# Patient Record
Sex: MALE | Race: WHITE | NOT HISPANIC OR LATINO
[De-identification: names, ages, dates, MRNs, and addresses within clinical notes are randomized per-mention and may not be internally consistent; named-entity substitution may affect disease eponyms.]

---

## 2021-05-17 PROBLEM — Z00.00 ENCOUNTER FOR PREVENTIVE HEALTH EXAMINATION: Status: ACTIVE | Noted: 2021-05-17

## 2021-05-18 ENCOUNTER — NON-APPOINTMENT (OUTPATIENT)
Age: 71
End: 2021-05-18

## 2021-05-25 ENCOUNTER — APPOINTMENT (OUTPATIENT)
Dept: OTOLARYNGOLOGY | Facility: CLINIC | Age: 71
End: 2021-05-25
Payer: MEDICARE

## 2021-05-25 ENCOUNTER — NON-APPOINTMENT (OUTPATIENT)
Age: 71
End: 2021-05-25

## 2021-05-25 VITALS — BODY MASS INDEX: 29.03 KG/M2 | HEIGHT: 69 IN | WEIGHT: 196 LBS

## 2021-05-25 DIAGNOSIS — Z82.49 FAMILY HISTORY OF ISCHEMIC HEART DISEASE AND OTHER DISEASES OF THE CIRCULATORY SYSTEM: ICD-10-CM

## 2021-05-25 DIAGNOSIS — F32.9 MAJOR DEPRESSIVE DISORDER, SINGLE EPISODE, UNSPECIFIED: ICD-10-CM

## 2021-05-25 DIAGNOSIS — Z72.89 OTHER PROBLEMS RELATED TO LIFESTYLE: ICD-10-CM

## 2021-05-25 DIAGNOSIS — Z78.9 OTHER SPECIFIED HEALTH STATUS: ICD-10-CM

## 2021-05-25 DIAGNOSIS — C61 MALIGNANT NEOPLASM OF PROSTATE: ICD-10-CM

## 2021-05-25 DIAGNOSIS — I10 ESSENTIAL (PRIMARY) HYPERTENSION: ICD-10-CM

## 2021-05-25 DIAGNOSIS — E78.5 HYPERLIPIDEMIA, UNSPECIFIED: ICD-10-CM

## 2021-05-25 PROCEDURE — 99205 OFFICE O/P NEW HI 60 MIN: CPT | Mod: 25

## 2021-05-25 RX ORDER — AMLODIPINE BESYLATE 5 MG/1
TABLET ORAL
Refills: 0 | Status: ACTIVE | COMMUNITY

## 2021-05-25 RX ORDER — OMEPRAZOLE 20 MG/1
20 CAPSULE, DELAYED RELEASE ORAL
Refills: 0 | Status: ACTIVE | COMMUNITY

## 2021-05-25 RX ORDER — ATORVASTATIN CALCIUM 80 MG/1
TABLET, FILM COATED ORAL
Refills: 0 | Status: ACTIVE | COMMUNITY

## 2021-05-25 RX ORDER — FLUTICASONE PROPIONATE 50 UG/1
50 SPRAY, METERED NASAL
Refills: 0 | Status: ACTIVE | COMMUNITY

## 2021-05-25 RX ORDER — TAMSULOSIN HYDROCHLORIDE 0.4 MG/1
CAPSULE ORAL
Refills: 0 | Status: ACTIVE | COMMUNITY

## 2021-05-25 RX ORDER — CARVEDILOL 12.5 MG/1
12.5 TABLET, FILM COATED ORAL
Refills: 0 | Status: ACTIVE | COMMUNITY

## 2021-05-25 RX ORDER — SERTRALINE HYDROCHLORIDE 25 MG/1
TABLET, FILM COATED ORAL
Refills: 0 | Status: ACTIVE | COMMUNITY

## 2021-05-25 NOTE — CONSULT LETTER
[Dear  ___] : Dear ~TEE, [Consult Letter:] : I had the pleasure of evaluating your patient, [unfilled]. [Please see my note below.] : Please see my note below. [Consult Closing:] : Thank you very much for allowing me to participate in the care of this patient.  If you have any questions, please do not hesitate to contact me. [Sincerely,] : Sincerely, [FreeTextEntry2] : Martin Schmid MD DDS [FreeTextEntry3] : Lauren Dao MD\par Otolaryngology - Head & Neck Surgery\par

## 2021-05-25 NOTE — HISTORY OF PRESENT ILLNESS
[de-identified] : Patient seen today in consultation at the kind request of Dr Martin Schmid for evaluation for reconstruction following right maxillectomy for ameloblastoma . Mr Brittany noted changes to his right upper gums in Jan 2021, thought he had a dental infection. Seen by a dentist, he had a scan which showed right maxillary mass. He then saw an ENT, Dr Damon Johnson, who performed biopsy, showed ameloblastoma. He was evaluated at [facility] and [facility] however they could not offer dental implants at the time of surgery, he then sought out Dr Schmid. Patient underwent MRI as well as CT LE last week in [place], [place]. He lives in [place].

## 2021-05-25 NOTE — ASSESSMENT
[FreeTextEntry1] : - discussed reconstructive options with patient following right maxillectomy for ameloblastoma. Options include osteocutaneous free flap reconstruction vs soft tissue vs obturator. Patient is interested in dental restoration, therefore soft tissue is the best option for him. He is not interested in an obturator as this requires daily removal and cleaning. Discussed osteocutaneous fibula free flap, possible bone only free flap depending on soft tissue defect. Discussed extensively the details of free flap with microvascular reconstruction. Discussed extensively the risks, benefits, and alternatives to treatment. Risks include but not limited to free flap loss, poor wound healing, scarring, need for wound care, possible return to OR, possible non-healing wounds, donor site morbidity including scarring and infection. Patient voices understanding and questions were answered to the patient's apparent satisfaction\par - he is requesting that the anesthesiologist be covered by his insurance, will notify OR booking\par - will f/u recent CT LE

## 2021-05-25 NOTE — PHYSICAL EXAM
[Nasal Endoscopy Performed] : nasal endoscopy was performed, see procedure section for findings [Midline] : trachea located in midline position [Normal] : no rashes [de-identified] : bilateral dorsalis pedis pulses

## 2021-05-25 NOTE — DATA REVIEWED
[de-identified] : relevant images and reports personally reviewed by me:\par 5/12/21\par CT sinus:\par 1. Status post interval sinonasal surgery.\par 2. Almost complete opacification of the right maxillary sinus with lytic expansion of the inferior maxilla. Differential consideration may include a polyp but the possibility of an inverted papilloma cannot be entirely excluded . If additional imaging is indicated clinically, a follow-up MRI may be performed for further evaluation.\par 3. Mild mucosal thickening in the bilateral frontal, left anterior ethmoid and the left maxillary sinuses.

## 2021-06-17 ENCOUNTER — APPOINTMENT (OUTPATIENT)
Dept: OTOLARYNGOLOGY | Facility: CLINIC | Age: 71
End: 2021-06-17

## 2021-07-13 ENCOUNTER — TRANSCRIPTION ENCOUNTER (OUTPATIENT)
Age: 71
End: 2021-07-13

## 2021-07-13 NOTE — PATIENT PROFILE ADULT - NSASFALLATTEMPTOOB_GEN_A_NUR
Worsenign of GFR noted  meds adjusted  Labs in 2-3 weeks prior next visit  Nephrology evaluation is advised   no

## 2021-07-14 ENCOUNTER — RESULT REVIEW (OUTPATIENT)
Age: 71
End: 2021-07-14

## 2021-07-14 ENCOUNTER — INPATIENT (INPATIENT)
Facility: HOSPITAL | Age: 71
LOS: 11 days | Discharge: NURSING, REHAB OR LONG-TERM CARE | DRG: 465 | End: 2021-07-26
Attending: DENTIST | Admitting: DENTIST
Payer: MEDICARE

## 2021-07-14 ENCOUNTER — APPOINTMENT (OUTPATIENT)
Dept: OTOLARYNGOLOGY | Facility: HOSPITAL | Age: 71
End: 2021-07-14

## 2021-07-14 VITALS
HEART RATE: 53 BPM | OXYGEN SATURATION: 95 % | WEIGHT: 199.74 LBS | DIASTOLIC BLOOD PRESSURE: 77 MMHG | TEMPERATURE: 98 F | SYSTOLIC BLOOD PRESSURE: 142 MMHG | HEIGHT: 69 IN | RESPIRATION RATE: 16 BRPM

## 2021-07-14 DIAGNOSIS — C41.0 MALIGNANT NEOPLASM OF BONES OF SKULL AND FACE: ICD-10-CM

## 2021-07-14 DIAGNOSIS — Z85.46 PERSONAL HISTORY OF MALIGNANT NEOPLASM OF PROSTATE: ICD-10-CM

## 2021-07-14 DIAGNOSIS — T41.205A ADVERSE EFFECT OF UNSPECIFIED GENERAL ANESTHETICS, INITIAL ENCOUNTER: ICD-10-CM

## 2021-07-14 DIAGNOSIS — R33.9 RETENTION OF URINE, UNSPECIFIED: ICD-10-CM

## 2021-07-14 DIAGNOSIS — I10 ESSENTIAL (PRIMARY) HYPERTENSION: ICD-10-CM

## 2021-07-14 DIAGNOSIS — Z98.890 OTHER SPECIFIED POSTPROCEDURAL STATES: Chronic | ICD-10-CM

## 2021-07-14 DIAGNOSIS — K40.90 UNILATERAL INGUINAL HERNIA, WITHOUT OBSTRUCTION OR GANGRENE, NOT SPECIFIED AS RECURRENT: Chronic | ICD-10-CM

## 2021-07-14 DIAGNOSIS — F43.22 ADJUSTMENT DISORDER WITH ANXIETY: ICD-10-CM

## 2021-07-14 DIAGNOSIS — R41.0 DISORIENTATION, UNSPECIFIED: ICD-10-CM

## 2021-07-14 DIAGNOSIS — E78.5 HYPERLIPIDEMIA, UNSPECIFIED: ICD-10-CM

## 2021-07-14 DIAGNOSIS — T42.4X5A ADVERSE EFFECT OF BENZODIAZEPINES, INITIAL ENCOUNTER: ICD-10-CM

## 2021-07-14 DIAGNOSIS — K21.9 GASTRO-ESOPHAGEAL REFLUX DISEASE WITHOUT ESOPHAGITIS: ICD-10-CM

## 2021-07-14 LAB
ALBUMIN SERPL ELPH-MCNC: 3.3 G/DL — SIGNIFICANT CHANGE UP (ref 3.3–5)
ANION GAP SERPL CALC-SCNC: 10 MMOL/L — SIGNIFICANT CHANGE UP (ref 5–17)
BASE EXCESS BLDA CALC-SCNC: -1 MMOL/L — SIGNIFICANT CHANGE UP (ref -2–3)
BASE EXCESS BLDA CALC-SCNC: -3.2 MMOL/L — LOW (ref -2–3)
BASE EXCESS BLDA CALC-SCNC: 0 MMOL/L — SIGNIFICANT CHANGE UP (ref -2–3)
BASOPHILS # BLD AUTO: 0.03 K/UL — SIGNIFICANT CHANGE UP (ref 0–0.2)
BASOPHILS NFR BLD AUTO: 0.2 % — SIGNIFICANT CHANGE UP (ref 0–2)
BUN SERPL-MCNC: 22 MG/DL — SIGNIFICANT CHANGE UP (ref 7–23)
CA-I BLDA-SCNC: 1.08 MMOL/L — LOW (ref 1.15–1.33)
CA-I BLDA-SCNC: 1.1 MMOL/L — LOW (ref 1.15–1.33)
CA-I BLDA-SCNC: 1.17 MMOL/L — SIGNIFICANT CHANGE UP (ref 1.15–1.33)
CALCIUM SERPL-MCNC: 7.8 MG/DL — LOW (ref 8.4–10.5)
CHLORIDE SERPL-SCNC: 108 MMOL/L — SIGNIFICANT CHANGE UP (ref 96–108)
CO2 BLDA-SCNC: 23 MMOL/L — SIGNIFICANT CHANGE UP (ref 19–24)
CO2 BLDA-SCNC: 26 MMOL/L — HIGH (ref 19–24)
CO2 SERPL-SCNC: 21 MMOL/L — LOW (ref 22–31)
COHGB MFR BLDA: 0.8 % — SIGNIFICANT CHANGE UP
COHGB MFR BLDA: 1 % — SIGNIFICANT CHANGE UP
COHGB MFR BLDA: 1.4 % — SIGNIFICANT CHANGE UP
CREAT SERPL-MCNC: 1.18 MG/DL — SIGNIFICANT CHANGE UP (ref 0.5–1.3)
EOSINOPHIL # BLD AUTO: 0 K/UL — SIGNIFICANT CHANGE UP (ref 0–0.5)
EOSINOPHIL NFR BLD AUTO: 0 % — SIGNIFICANT CHANGE UP (ref 0–6)
GLUCOSE BLDC GLUCOMTR-MCNC: 148 MG/DL — HIGH (ref 70–99)
GLUCOSE BLDC GLUCOMTR-MCNC: 160 MG/DL — HIGH (ref 70–99)
GLUCOSE BLDC GLUCOMTR-MCNC: 177 MG/DL — HIGH (ref 70–99)
GLUCOSE BLDC GLUCOMTR-MCNC: 209 MG/DL — HIGH (ref 70–99)
GLUCOSE SERPL-MCNC: 215 MG/DL — HIGH (ref 70–99)
HCO3 BLDA-SCNC: 22 MMOL/L — SIGNIFICANT CHANGE UP (ref 21–28)
HCO3 BLDA-SCNC: 24 MMOL/L — SIGNIFICANT CHANGE UP (ref 21–28)
HCO3 BLDA-SCNC: 26 MMOL/L — SIGNIFICANT CHANGE UP (ref 21–28)
HCT VFR BLD CALC: 32.3 % — LOW (ref 39–50)
HGB BLD-MCNC: 10.8 G/DL — LOW (ref 13–17)
HGB BLDA-MCNC: 11.7 G/DL — LOW (ref 12.6–17.4)
HGB BLDA-MCNC: 9.4 G/DL — LOW (ref 12.6–17.4)
HGB BLDA-MCNC: 9.6 G/DL — LOW (ref 12.6–17.4)
IMM GRANULOCYTES NFR BLD AUTO: 0.3 % — SIGNIFICANT CHANGE UP (ref 0–1.5)
LACTATE SERPL-SCNC: 1.7 MMOL/L — SIGNIFICANT CHANGE UP (ref 0.5–2)
LYMPHOCYTES # BLD AUTO: 0.62 K/UL — LOW (ref 1–3.3)
LYMPHOCYTES # BLD AUTO: 4.2 % — LOW (ref 13–44)
MAGNESIUM SERPL-MCNC: 1.4 MG/DL — LOW (ref 1.6–2.6)
MCHC RBC-ENTMCNC: 27.6 PG — SIGNIFICANT CHANGE UP (ref 27–34)
MCHC RBC-ENTMCNC: 33.4 GM/DL — SIGNIFICANT CHANGE UP (ref 32–36)
MCV RBC AUTO: 82.6 FL — SIGNIFICANT CHANGE UP (ref 80–100)
METHGB MFR BLDA: 0.8 % — SIGNIFICANT CHANGE UP
METHGB MFR BLDA: 0.8 % — SIGNIFICANT CHANGE UP
METHGB MFR BLDA: 1.1 % — SIGNIFICANT CHANGE UP
MONOCYTES # BLD AUTO: 0.83 K/UL — SIGNIFICANT CHANGE UP (ref 0–0.9)
MONOCYTES NFR BLD AUTO: 5.6 % — SIGNIFICANT CHANGE UP (ref 2–14)
NEUTROPHILS # BLD AUTO: 13.38 K/UL — HIGH (ref 1.8–7.4)
NEUTROPHILS NFR BLD AUTO: 89.7 % — HIGH (ref 43–77)
NRBC # BLD: 0 /100 WBCS — SIGNIFICANT CHANGE UP (ref 0–0)
OXYHGB MFR BLDA: 97.9 % — HIGH (ref 90–95)
OXYHGB MFR BLDA: 98 % — HIGH (ref 90–95)
OXYHGB MFR BLDA: 98.1 % — HIGH (ref 90–95)
PCO2 BLDA: 39 MMHG — SIGNIFICANT CHANGE UP (ref 35–48)
PCO2 BLDA: 42 MMHG — SIGNIFICANT CHANGE UP (ref 35–48)
PCO2 BLDA: 47 MMHG — SIGNIFICANT CHANGE UP (ref 35–48)
PH BLDA: 7.35 — SIGNIFICANT CHANGE UP (ref 7.35–7.45)
PH BLDA: 7.36 — SIGNIFICANT CHANGE UP (ref 7.35–7.45)
PH BLDA: 7.37 — SIGNIFICANT CHANGE UP (ref 7.35–7.45)
PHOSPHATE SERPL-MCNC: 5 MG/DL — HIGH (ref 2.5–4.5)
PLATELET # BLD AUTO: 158 K/UL — SIGNIFICANT CHANGE UP (ref 150–400)
PO2 BLDA: 180 MMHG — HIGH (ref 83–108)
PO2 BLDA: 254 MMHG — HIGH (ref 83–108)
PO2 BLDA: 281 MMHG — HIGH (ref 83–108)
POTASSIUM BLDA-SCNC: 3.9 MMOL/L — SIGNIFICANT CHANGE UP (ref 3.5–5.1)
POTASSIUM BLDA-SCNC: 3.9 MMOL/L — SIGNIFICANT CHANGE UP (ref 3.5–5.1)
POTASSIUM BLDA-SCNC: 4.1 MMOL/L — SIGNIFICANT CHANGE UP (ref 3.5–5.1)
POTASSIUM SERPL-MCNC: 4.5 MMOL/L — SIGNIFICANT CHANGE UP (ref 3.5–5.3)
POTASSIUM SERPL-SCNC: 4.5 MMOL/L — SIGNIFICANT CHANGE UP (ref 3.5–5.3)
RBC # BLD: 3.91 M/UL — LOW (ref 4.2–5.8)
RBC # FLD: 13.2 % — SIGNIFICANT CHANGE UP (ref 10.3–14.5)
SAO2 % BLDA: 100 % — HIGH (ref 94–98)
SAO2 % BLDA: 100 % — HIGH (ref 94–98)
SAO2 % BLDA: 99.8 % — HIGH (ref 94–98)
SODIUM BLDA-SCNC: 137 MMOL/L — SIGNIFICANT CHANGE UP (ref 136–145)
SODIUM BLDA-SCNC: 138 MMOL/L — SIGNIFICANT CHANGE UP (ref 136–145)
SODIUM BLDA-SCNC: 139 MMOL/L — SIGNIFICANT CHANGE UP (ref 136–145)
SODIUM SERPL-SCNC: 139 MMOL/L — SIGNIFICANT CHANGE UP (ref 135–145)
WBC # BLD: 14.91 K/UL — HIGH (ref 3.8–10.5)
WBC # FLD AUTO: 14.91 K/UL — HIGH (ref 3.8–10.5)

## 2021-07-14 PROCEDURE — 20969 BONE/SKIN GRAFT MICROVASC: CPT

## 2021-07-14 PROCEDURE — 40654 RPR LIP FTH>1HALF VER HT/CPX: CPT

## 2021-07-14 PROCEDURE — 88305 TISSUE EXAM BY PATHOLOGIST: CPT | Mod: 26

## 2021-07-14 PROCEDURE — 88331 PATH CONSLTJ SURG 1 BLK 1SPC: CPT | Mod: 26

## 2021-07-14 PROCEDURE — 99221 1ST HOSP IP/OBS SF/LOW 40: CPT | Mod: GC

## 2021-07-14 PROCEDURE — 15100 SPLT AGRFT T/A/L 1ST 100SQCM: CPT

## 2021-07-14 PROCEDURE — 71045 X-RAY EXAM CHEST 1 VIEW: CPT | Mod: 26

## 2021-07-14 PROCEDURE — 88307 TISSUE EXAM BY PATHOLOGIST: CPT | Mod: 26

## 2021-07-14 PROCEDURE — 88300 SURGICAL PATH GROSS: CPT | Mod: 26

## 2021-07-14 PROCEDURE — 14040 TIS TRNFR F/C/C/M/N/A/G/H/F: CPT

## 2021-07-14 RX ORDER — CARVEDILOL PHOSPHATE 80 MG/1
0 CAPSULE, EXTENDED RELEASE ORAL
Qty: 0 | Refills: 0 | DISCHARGE

## 2021-07-14 RX ORDER — HYDROMORPHONE HYDROCHLORIDE 2 MG/ML
1 INJECTION INTRAMUSCULAR; INTRAVENOUS; SUBCUTANEOUS EVERY 4 HOURS
Refills: 0 | Status: DISCONTINUED | OUTPATIENT
Start: 2021-07-14 | End: 2021-07-18

## 2021-07-14 RX ORDER — SERTRALINE 25 MG/1
1 TABLET, FILM COATED ORAL
Qty: 0 | Refills: 0 | DISCHARGE

## 2021-07-14 RX ORDER — OMEPRAZOLE 10 MG/1
1 CAPSULE, DELAYED RELEASE ORAL
Qty: 0 | Refills: 0 | DISCHARGE

## 2021-07-14 RX ORDER — VANCOMYCIN HCL 1 G
125 VIAL (EA) INTRAVENOUS EVERY 12 HOURS
Refills: 0 | Status: DISCONTINUED | OUTPATIENT
Start: 2021-07-14 | End: 2021-07-15

## 2021-07-14 RX ORDER — HEPARIN SODIUM 5000 [USP'U]/ML
5000 INJECTION INTRAVENOUS; SUBCUTANEOUS EVERY 8 HOURS
Refills: 0 | Status: DISCONTINUED | OUTPATIENT
Start: 2021-07-15 | End: 2021-07-26

## 2021-07-14 RX ORDER — ASPIRIN/CALCIUM CARB/MAGNESIUM 324 MG
300 TABLET ORAL ONCE
Refills: 0 | Status: COMPLETED | OUTPATIENT
Start: 2021-07-14 | End: 2021-07-14

## 2021-07-14 RX ORDER — AMLODIPINE BESYLATE 2.5 MG/1
1 TABLET ORAL
Qty: 0 | Refills: 0 | DISCHARGE

## 2021-07-14 RX ORDER — METRONIDAZOLE 500 MG
500 TABLET ORAL EVERY 8 HOURS
Refills: 0 | Status: DISCONTINUED | OUTPATIENT
Start: 2021-07-14 | End: 2021-07-19

## 2021-07-14 RX ORDER — TAMSULOSIN HYDROCHLORIDE 0.4 MG/1
2 CAPSULE ORAL
Qty: 0 | Refills: 0 | DISCHARGE

## 2021-07-14 RX ORDER — DEXTROSE 50 % IN WATER 50 %
15 SYRINGE (ML) INTRAVENOUS ONCE
Refills: 0 | Status: DISCONTINUED | OUTPATIENT
Start: 2021-07-14 | End: 2021-07-26

## 2021-07-14 RX ORDER — AMPICILLIN SODIUM AND SULBACTAM SODIUM 250; 125 MG/ML; MG/ML
3 INJECTION, POWDER, FOR SUSPENSION INTRAMUSCULAR; INTRAVENOUS ONCE
Refills: 0 | Status: DISCONTINUED | OUTPATIENT
Start: 2021-07-14 | End: 2021-07-14

## 2021-07-14 RX ORDER — VANCOMYCIN HCL 1 G
1000 VIAL (EA) INTRAVENOUS EVERY 12 HOURS
Refills: 0 | Status: DISCONTINUED | OUTPATIENT
Start: 2021-07-14 | End: 2021-07-14

## 2021-07-14 RX ORDER — CEFAZOLIN SODIUM 1 G
2000 VIAL (EA) INJECTION EVERY 8 HOURS
Refills: 0 | Status: DISCONTINUED | OUTPATIENT
Start: 2021-07-14 | End: 2021-07-19

## 2021-07-14 RX ORDER — INSULIN LISPRO 100/ML
VIAL (ML) SUBCUTANEOUS EVERY 6 HOURS
Refills: 0 | Status: DISCONTINUED | OUTPATIENT
Start: 2021-07-14 | End: 2021-07-26

## 2021-07-14 RX ORDER — ATORVASTATIN CALCIUM 80 MG/1
1 TABLET, FILM COATED ORAL
Qty: 0 | Refills: 0 | DISCHARGE

## 2021-07-14 RX ORDER — CEFAZOLIN SODIUM 1 G
2000 VIAL (EA) INJECTION EVERY 8 HOURS
Refills: 0 | Status: DISCONTINUED | OUTPATIENT
Start: 2021-07-14 | End: 2021-07-14

## 2021-07-14 RX ORDER — ASPIRIN/CALCIUM CARB/MAGNESIUM 324 MG
81 TABLET ORAL DAILY
Refills: 0 | Status: DISCONTINUED | OUTPATIENT
Start: 2021-07-15 | End: 2021-07-26

## 2021-07-14 RX ORDER — DEXTROSE 50 % IN WATER 50 %
12.5 SYRINGE (ML) INTRAVENOUS ONCE
Refills: 0 | Status: DISCONTINUED | OUTPATIENT
Start: 2021-07-14 | End: 2021-07-26

## 2021-07-14 RX ORDER — ACETAMINOPHEN 500 MG
1000 TABLET ORAL ONCE
Refills: 0 | Status: COMPLETED | OUTPATIENT
Start: 2021-07-14 | End: 2021-07-14

## 2021-07-14 RX ORDER — DEXTROSE 50 % IN WATER 50 %
25 SYRINGE (ML) INTRAVENOUS ONCE
Refills: 0 | Status: DISCONTINUED | OUTPATIENT
Start: 2021-07-14 | End: 2021-07-26

## 2021-07-14 RX ORDER — SODIUM CHLORIDE 9 MG/ML
1000 INJECTION, SOLUTION INTRAVENOUS
Refills: 0 | Status: DISCONTINUED | OUTPATIENT
Start: 2021-07-14 | End: 2021-07-16

## 2021-07-14 RX ORDER — SODIUM CHLORIDE 9 MG/ML
1000 INJECTION, SOLUTION INTRAVENOUS
Refills: 0 | Status: DISCONTINUED | OUTPATIENT
Start: 2021-07-14 | End: 2021-07-26

## 2021-07-14 RX ORDER — OXYMETAZOLINE HYDROCHLORIDE 0.5 MG/ML
2 SPRAY NASAL EVERY 12 HOURS
Refills: 0 | Status: DISCONTINUED | OUTPATIENT
Start: 2021-07-14 | End: 2021-07-23

## 2021-07-14 RX ORDER — DOXAZOSIN MESYLATE 4 MG
1 TABLET ORAL AT BEDTIME
Refills: 0 | Status: DISCONTINUED | OUTPATIENT
Start: 2021-07-14 | End: 2021-07-15

## 2021-07-14 RX ORDER — FLUTICASONE PROPIONATE 220 MCG
1 AEROSOL WITH ADAPTER (GRAM) INHALATION
Qty: 0 | Refills: 0 | DISCHARGE

## 2021-07-14 RX ORDER — HYDROMORPHONE HYDROCHLORIDE 2 MG/ML
0.5 INJECTION INTRAMUSCULAR; INTRAVENOUS; SUBCUTANEOUS EVERY 4 HOURS
Refills: 0 | Status: DISCONTINUED | OUTPATIENT
Start: 2021-07-14 | End: 2021-07-18

## 2021-07-14 RX ORDER — MAGNESIUM SULFATE 500 MG/ML
2 VIAL (ML) INJECTION
Refills: 0 | Status: COMPLETED | OUTPATIENT
Start: 2021-07-14 | End: 2021-07-14

## 2021-07-14 RX ORDER — GLUCAGON INJECTION, SOLUTION 0.5 MG/.1ML
1 INJECTION, SOLUTION SUBCUTANEOUS ONCE
Refills: 0 | Status: DISCONTINUED | OUTPATIENT
Start: 2021-07-14 | End: 2021-07-26

## 2021-07-14 RX ORDER — PANTOPRAZOLE SODIUM 20 MG/1
40 TABLET, DELAYED RELEASE ORAL DAILY
Refills: 0 | Status: DISCONTINUED | OUTPATIENT
Start: 2021-07-15 | End: 2021-07-26

## 2021-07-14 RX ORDER — SERTRALINE 25 MG/1
100 TABLET, FILM COATED ORAL DAILY
Refills: 0 | Status: DISCONTINUED | OUTPATIENT
Start: 2021-07-15 | End: 2021-07-23

## 2021-07-14 RX ORDER — TAMSULOSIN HYDROCHLORIDE 0.4 MG/1
0.8 CAPSULE ORAL AT BEDTIME
Refills: 0 | Status: DISCONTINUED | OUTPATIENT
Start: 2021-07-14 | End: 2021-07-14

## 2021-07-14 RX ORDER — DEXAMETHASONE 0.5 MG/5ML
10 ELIXIR ORAL EVERY 8 HOURS
Refills: 0 | Status: COMPLETED | OUTPATIENT
Start: 2021-07-14 | End: 2021-07-15

## 2021-07-14 RX ADMIN — Medication 102 MILLIGRAM(S): at 23:08

## 2021-07-14 RX ADMIN — Medication 1000 MILLIGRAM(S): at 23:23

## 2021-07-14 RX ADMIN — Medication 4: at 22:18

## 2021-07-14 RX ADMIN — Medication 1 MILLIGRAM(S): at 22:23

## 2021-07-14 RX ADMIN — Medication 50 GRAM(S): at 23:07

## 2021-07-14 RX ADMIN — Medication 400 MILLIGRAM(S): at 22:23

## 2021-07-14 RX ADMIN — Medication 2000 MILLIGRAM(S): at 21:22

## 2021-07-14 RX ADMIN — SODIUM CHLORIDE 125 MILLILITER(S): 9 INJECTION, SOLUTION INTRAVENOUS at 21:23

## 2021-07-14 RX ADMIN — Medication 100 MILLIGRAM(S): at 21:23

## 2021-07-14 RX ADMIN — Medication 300 MILLIGRAM(S): at 21:22

## 2021-07-14 RX ADMIN — Medication 50 GRAM(S): at 21:22

## 2021-07-14 NOTE — H&P ADULT - ASSESSMENT
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, dental implants, R fibula MVFF, R thigh STSG 7/14    #Neuro  - pain control per ICU     #ENT  - RN flap checks q1h  -  AK POD0, then ASA 81 via NGT daily  - SQH POD1    - Monitor IRVING drain outputs     #CV  - Monitor BP, maintain MAPs   - Avoid pressors  - 500 cc bolus NS PRN for MAP maintenance     #Resp  - O2 support as indicated    #GI  - NPO, IVF  - CXR to confirm NGT placement  - TF to begin likely POD1, obtain nutrition consult and advance to goal per nutrition recs   - Bowel regimen while receiving opiates for pain  - PPI, anti-emetics   - Monitor lytes; replace PRN    #ID  - Monitor temp, WBC  - Vanco/flagyl  -ID consult for recent C.diff    #MSK  - Monitor leg incision   - IRVING to self suction, wound vac to suction  - Bedrest, OOBTC POD1 vs POD2 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, dental implants, R fibula MVFF, R thigh STSG 7/14    #Neuro  - pain control per ICU     #ENT  - RN flap checks q1h  -  DE POD0, then ASA 81 via NGT daily  - SQH POD1    - Monitor IRVING drain outputs     #CV  - Monitor BP, maintain MAPs   - Avoid pressors  - 500 cc bolus NS PRN for MAP maintenance     #Resp  - O2 support as indicated    #GI  - NPO, IVF  - CXR to confirm NGT placement  - TF to begin likely POD1, obtain nutrition consult and advance to goal per nutrition recs   - Bowel regimen while receiving opiates for pain  - PPI, anti-emetics   - Monitor lytes; replace PRN    #ID  - Monitor temp, WBC  - Vanco/flagyl  -ID consult for recent C.diff    #MSK  - Monitor leg incision   - IRVING to self suction, wound vac to suction  - Bedrest, OOBTC POD1 vs POD2

## 2021-07-14 NOTE — CONSULT NOTE ADULT - SUBJECTIVE AND OBJECTIVE BOX
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 Pt now s/p R maxillectomy, R fibula MVFF, R thigh STSG 7/14. (14 Jul 2021 17:23)      PAST MEDICAL & SURGICAL HISTORY:  Cancer of prostate    History of hormone therapy  testosterone blockers  May 2021    Depression, unspecified depression type    Hypertension, unspecified type    Hyperlipidemia, unspecified hyperlipidemia type    H/O gastroesophageal reflux (GERD)    Benign neoplasm    Clostridium difficile diarrhea  JAn 2021    Inguinal hernia, right    History of umbilical hernia repair    History of endoscopic sinus surgery        ROS: See HPI    MEDICATIONS  (STANDING):    MEDICATIONS  (PRN):      Allergies    sulfa drugs (Unknown)    Intolerances        SOCIAL HISTORY:  Smoke: Never Smoker  EtOH: occasional    FAMILY HISTORY:      Vital Signs Last 24 Hrs  T(C): 36.6 (14 Jul 2021 06:24), Max: 36.6 (14 Jul 2021 06:24)  T(F): 97.9 (14 Jul 2021 06:24), Max: 97.9 (14 Jul 2021 06:24)  HR: 53 (14 Jul 2021 06:24) (53 - 53)  BP: 142/77 (14 Jul 2021 06:24) (142/77 - 142/77)  BP(mean): --  RR: 16 (14 Jul 2021 06:24) (16 - 16)  SpO2: 95% (14 Jul 2021 06:24) (95% - 95%)    PHYSICAL EXAM    Gen: NAD   Neuro: Somnolent but easily roused.   HEENT: Rt nasal trumpet in place with doboff through it and surgicel around it. Intra oral incisions intact, with minimal drainage.   CV: RRR reg s1s2 no M  Pulm: CTA B/L no w/w/r decreased BS in bases  Abd: Soft, obese abdomen NT/ND  Ext: No C/C/E Rt Thigh STSG site with tegaderm in place + Rt calf with ace wrap and ss amita x1. Good distal cap refill + DP  B/l   Skin: No rashes erythema or ecchymosis  MSK: No joint swelling noted  Psych: Unable to assess as pt awakening from anesthesia.     LABS:                RADIOLOGY & ADDITIONAL STUDIES:    Assessment and Plan:  71M PMH depression, HL, HTN, Prostate ca, ethmoidectomy, hernia repair. Dx w/ R maxillary ameloblastoma ~May2021, admitted for elective Maxillectomy, reconstruction w/fibula free flap    Neuro: Zoloft, Dilaudid PRN  HEENT: Flap checks q1  VT stat  and 81 in am Decadron 10 q8 x3  post op   CV: HD stable LR @125 Avoid pressors Home Coreg & Norvasc - held post operatively   Pulm: Satting well on FT  GI: NPO Doboff Protonix F/u CXR for doboff placement    : Mireles Hx of BPH Takes flomax at home - change to cardura while using Doboff.   ID: hx of Cdiff:  Vanco( 7/14-), Flagyl( 7/14 - )   Endo: ISS  ppx: SCD Start SQH in am  Lines: PIV Xochilt( 7/14-)   Wounds: AMITA x  PT/OT: Not Ordered

## 2021-07-14 NOTE — H&P ADULT - NSHPPHYSICALEXAM_GEN_ALL_CORE
NAD  NGT sutured to nasal cavity  Intraoral skin paddle warm, well-perfused, incisions c/d/i with triphasic doppler signal  Neck incisions c/d/i, IRVING with blood  R thigh STSG donor site dressed with tegederm  R fibula donor site wrapped with cast/ACE, IRVING x1 with blood, wound vac holding suction NAD  R nasal cavity with nasal trumpet and NGT sutured to septum  Intraoral skin paddle warm, well-perfused, incisions c/d/i with triphasic doppler signal  Neck incisions c/d/i, IRVING with blood  Breathing comfortably on face tent  R thigh STSG donor site dressed with tegederm  R fibula donor site wrapped with cast/ACE, IRVING x1 with blood, wound vac holding suction

## 2021-07-14 NOTE — H&P ADULT - NSICDXPASTMEDICALHX_GEN_ALL_CORE_FT
PAST MEDICAL HISTORY:  Benign neoplasm     Cancer of prostate     Clostridium difficile diarrhea JAn 2021    Depression, unspecified depression type     H/O gastroesophageal reflux (GERD)     History of hormone therapy testosterone blockers  May 2021    Hyperlipidemia, unspecified hyperlipidemia type     Hypertension, unspecified type

## 2021-07-14 NOTE — CONSULT NOTE ADULT - CONSULT REASON
71M PMH depression, HL, HTN, Prostate ca, ethmoidectomy, hernia repair. Dx w/ R maxillary ameloblastoma ~May2021, admitted for  R maxillectomy, R fibula MVFF, R thigh STSG

## 2021-07-14 NOTE — CONSULT NOTE ADULT - ATTENDING COMMENTS
Jose Soto [id number]  Mr. Soto is a 70 y/o male with HTN, prostate cancer, ethmoidectomy, right maxillary ameloblastoma s/p right maxillectomy,  right fibula donor flap, right thigh STSG,  he was sent to the SICU for post op management.  VSS, in bed with HOB elevated, (+)NGT, moans.  Right thigh with dressing, right calf wrapped with surgical dressing, distal pulses good.    The patient was evaluated at bedside with the resident and PA, management decisions made, see above for the details, I agree with the A/P.  -right maxillary ameloblastoma s/p maxillectomy, reconstruction with fibula free flap  -htn  -h/o C.diff  >Tylenol IV for mild to moderate pain, dilaudid for severe pain or pain not controlled with Tylenol  >O2 to to keep SO2 above 94%  >NPO  >leave medel in place for tonight  >as per surgery on vanco and flagyl for h/o C. Diff  >SQ heparin in the am if hgb stable  Please see above for the details.

## 2021-07-15 LAB
A1C WITH ESTIMATED AVERAGE GLUCOSE RESULT: 5.5 % — SIGNIFICANT CHANGE UP (ref 4–5.6)
ANION GAP SERPL CALC-SCNC: 9 MMOL/L — SIGNIFICANT CHANGE UP (ref 5–17)
BUN SERPL-MCNC: 23 MG/DL — SIGNIFICANT CHANGE UP (ref 7–23)
CALCIUM SERPL-MCNC: 7.9 MG/DL — LOW (ref 8.4–10.5)
CHLORIDE SERPL-SCNC: 105 MMOL/L — SIGNIFICANT CHANGE UP (ref 96–108)
CO2 SERPL-SCNC: 23 MMOL/L — SIGNIFICANT CHANGE UP (ref 22–31)
COVID-19 SPIKE DOMAIN AB INTERP: POSITIVE
COVID-19 SPIKE DOMAIN ANTIBODY RESULT: >250 U/ML — HIGH
CREAT SERPL-MCNC: 1.32 MG/DL — HIGH (ref 0.5–1.3)
ESTIMATED AVERAGE GLUCOSE: 111 MG/DL — SIGNIFICANT CHANGE UP (ref 68–114)
GLUCOSE BLDC GLUCOMTR-MCNC: 176 MG/DL — HIGH (ref 70–99)
GLUCOSE BLDC GLUCOMTR-MCNC: 176 MG/DL — HIGH (ref 70–99)
GLUCOSE BLDC GLUCOMTR-MCNC: 196 MG/DL — HIGH (ref 70–99)
GLUCOSE BLDC GLUCOMTR-MCNC: 198 MG/DL — HIGH (ref 70–99)
GLUCOSE SERPL-MCNC: 182 MG/DL — HIGH (ref 70–99)
HCT VFR BLD CALC: 27.9 % — LOW (ref 39–50)
HCV AB S/CO SERPL IA: 0.04 S/CO — SIGNIFICANT CHANGE UP
HCV AB SERPL-IMP: SIGNIFICANT CHANGE UP
HGB BLD-MCNC: 9.6 G/DL — LOW (ref 13–17)
MAGNESIUM SERPL-MCNC: 2.5 MG/DL — SIGNIFICANT CHANGE UP (ref 1.6–2.6)
MCHC RBC-ENTMCNC: 28.3 PG — SIGNIFICANT CHANGE UP (ref 27–34)
MCHC RBC-ENTMCNC: 34.4 GM/DL — SIGNIFICANT CHANGE UP (ref 32–36)
MCV RBC AUTO: 82.3 FL — SIGNIFICANT CHANGE UP (ref 80–100)
NRBC # BLD: 0 /100 WBCS — SIGNIFICANT CHANGE UP (ref 0–0)
PHOSPHATE SERPL-MCNC: 3.9 MG/DL — SIGNIFICANT CHANGE UP (ref 2.5–4.5)
PLATELET # BLD AUTO: 125 K/UL — LOW (ref 150–400)
POTASSIUM SERPL-MCNC: 4 MMOL/L — SIGNIFICANT CHANGE UP (ref 3.5–5.3)
POTASSIUM SERPL-SCNC: 4 MMOL/L — SIGNIFICANT CHANGE UP (ref 3.5–5.3)
RBC # BLD: 3.39 M/UL — LOW (ref 4.2–5.8)
RBC # FLD: 13.4 % — SIGNIFICANT CHANGE UP (ref 10.3–14.5)
SARS-COV-2 IGG+IGM SERPL QL IA: >250 U/ML — HIGH
SARS-COV-2 IGG+IGM SERPL QL IA: POSITIVE
SODIUM SERPL-SCNC: 137 MMOL/L — SIGNIFICANT CHANGE UP (ref 135–145)
WBC # BLD: 12.69 K/UL — HIGH (ref 3.8–10.5)
WBC # FLD AUTO: 12.69 K/UL — HIGH (ref 3.8–10.5)

## 2021-07-15 PROCEDURE — 99232 SBSQ HOSP IP/OBS MODERATE 35: CPT | Mod: GC

## 2021-07-15 PROCEDURE — 71045 X-RAY EXAM CHEST 1 VIEW: CPT | Mod: 26

## 2021-07-15 RX ORDER — LANOLIN ALCOHOL/MO/W.PET/CERES
3 CREAM (GRAM) TOPICAL AT BEDTIME
Refills: 0 | Status: DISCONTINUED | OUTPATIENT
Start: 2021-07-15 | End: 2021-07-15

## 2021-07-15 RX ORDER — CHLORHEXIDINE GLUCONATE 213 G/1000ML
15 SOLUTION TOPICAL
Refills: 0 | Status: DISCONTINUED | OUTPATIENT
Start: 2021-07-15 | End: 2021-07-26

## 2021-07-15 RX ORDER — CHLORHEXIDINE GLUCONATE 213 G/1000ML
1 SOLUTION TOPICAL DAILY
Refills: 0 | Status: DISCONTINUED | OUTPATIENT
Start: 2021-07-15 | End: 2021-07-18

## 2021-07-15 RX ORDER — ACETAMINOPHEN 500 MG
1000 TABLET ORAL ONCE
Refills: 0 | Status: COMPLETED | OUTPATIENT
Start: 2021-07-15 | End: 2021-07-15

## 2021-07-15 RX ORDER — TAMSULOSIN HYDROCHLORIDE 0.4 MG/1
0.8 CAPSULE ORAL AT BEDTIME
Refills: 0 | Status: DISCONTINUED | OUTPATIENT
Start: 2021-07-15 | End: 2021-07-23

## 2021-07-15 RX ORDER — VANCOMYCIN HCL 1 G
125 VIAL (EA) INTRAVENOUS DAILY
Refills: 0 | Status: DISCONTINUED | OUTPATIENT
Start: 2021-07-15 | End: 2021-07-15

## 2021-07-15 RX ORDER — VANCOMYCIN HCL 1 G
125 VIAL (EA) INTRAVENOUS EVERY 12 HOURS
Refills: 0 | Status: DISCONTINUED | OUTPATIENT
Start: 2021-07-15 | End: 2021-07-19

## 2021-07-15 RX ADMIN — Medication 102 MILLIGRAM(S): at 15:39

## 2021-07-15 RX ADMIN — Medication 100 MILLIGRAM(S): at 06:30

## 2021-07-15 RX ADMIN — Medication 81 MILLIGRAM(S): at 11:27

## 2021-07-15 RX ADMIN — Medication 2000 MILLIGRAM(S): at 13:59

## 2021-07-15 RX ADMIN — Medication 2: at 17:11

## 2021-07-15 RX ADMIN — PANTOPRAZOLE SODIUM 40 MILLIGRAM(S): 20 TABLET, DELAYED RELEASE ORAL at 11:27

## 2021-07-15 RX ADMIN — Medication 100 MILLIGRAM(S): at 21:12

## 2021-07-15 RX ADMIN — Medication 1000 MILLIGRAM(S): at 15:00

## 2021-07-15 RX ADMIN — OXYMETAZOLINE HYDROCHLORIDE 2 SPRAY(S): 0.5 SPRAY NASAL at 06:31

## 2021-07-15 RX ADMIN — HYDROMORPHONE HYDROCHLORIDE 0.5 MILLIGRAM(S): 2 INJECTION INTRAMUSCULAR; INTRAVENOUS; SUBCUTANEOUS at 10:03

## 2021-07-15 RX ADMIN — Medication 125 MILLIGRAM(S): at 09:46

## 2021-07-15 RX ADMIN — CHLORHEXIDINE GLUCONATE 15 MILLILITER(S): 213 SOLUTION TOPICAL at 13:59

## 2021-07-15 RX ADMIN — Medication 125 MILLIGRAM(S): at 17:11

## 2021-07-15 RX ADMIN — Medication 2: at 23:06

## 2021-07-15 RX ADMIN — HYDROMORPHONE HYDROCHLORIDE 1 MILLIGRAM(S): 2 INJECTION INTRAMUSCULAR; INTRAVENOUS; SUBCUTANEOUS at 05:00

## 2021-07-15 RX ADMIN — Medication 102 MILLIGRAM(S): at 07:09

## 2021-07-15 RX ADMIN — Medication 2: at 11:27

## 2021-07-15 RX ADMIN — CHLORHEXIDINE GLUCONATE 15 MILLILITER(S): 213 SOLUTION TOPICAL at 21:12

## 2021-07-15 RX ADMIN — Medication 2000 MILLIGRAM(S): at 21:12

## 2021-07-15 RX ADMIN — HYDROMORPHONE HYDROCHLORIDE 1 MILLIGRAM(S): 2 INJECTION INTRAMUSCULAR; INTRAVENOUS; SUBCUTANEOUS at 04:32

## 2021-07-15 RX ADMIN — Medication 400 MILLIGRAM(S): at 14:44

## 2021-07-15 RX ADMIN — HEPARIN SODIUM 5000 UNIT(S): 5000 INJECTION INTRAVENOUS; SUBCUTANEOUS at 13:58

## 2021-07-15 RX ADMIN — SERTRALINE 100 MILLIGRAM(S): 25 TABLET, FILM COATED ORAL at 11:27

## 2021-07-15 RX ADMIN — Medication 2: at 06:33

## 2021-07-15 RX ADMIN — HEPARIN SODIUM 5000 UNIT(S): 5000 INJECTION INTRAVENOUS; SUBCUTANEOUS at 06:30

## 2021-07-15 RX ADMIN — HYDROMORPHONE HYDROCHLORIDE 0.5 MILLIGRAM(S): 2 INJECTION INTRAMUSCULAR; INTRAVENOUS; SUBCUTANEOUS at 10:20

## 2021-07-15 RX ADMIN — HYDROMORPHONE HYDROCHLORIDE 1 MILLIGRAM(S): 2 INJECTION INTRAMUSCULAR; INTRAVENOUS; SUBCUTANEOUS at 16:20

## 2021-07-15 RX ADMIN — Medication 100 MILLIGRAM(S): at 14:00

## 2021-07-15 RX ADMIN — TAMSULOSIN HYDROCHLORIDE 0.8 MILLIGRAM(S): 0.4 CAPSULE ORAL at 21:13

## 2021-07-15 RX ADMIN — HYDROMORPHONE HYDROCHLORIDE 1 MILLIGRAM(S): 2 INJECTION INTRAMUSCULAR; INTRAVENOUS; SUBCUTANEOUS at 16:35

## 2021-07-15 RX ADMIN — OXYMETAZOLINE HYDROCHLORIDE 2 SPRAY(S): 0.5 SPRAY NASAL at 17:11

## 2021-07-15 RX ADMIN — Medication 2000 MILLIGRAM(S): at 06:30

## 2021-07-15 RX ADMIN — HEPARIN SODIUM 5000 UNIT(S): 5000 INJECTION INTRAVENOUS; SUBCUTANEOUS at 21:12

## 2021-07-15 RX ADMIN — HYDROMORPHONE HYDROCHLORIDE 0.5 MILLIGRAM(S): 2 INJECTION INTRAMUSCULAR; INTRAVENOUS; SUBCUTANEOUS at 22:59

## 2021-07-15 NOTE — PROGRESS NOTE ADULT - ASSESSMENT
71M PMH depression, HL, HTN, Prostate ca, ethmoidectomy, hernia repair. Dx w/ R maxillary ameloblastoma ~May2021, admitted for rt Maxillectomy, reconstruction w/ Rt fibula free flap and rSTSG.    Neuro: Zoloft, Dilaudid + Tylenol PRN   HEENT: Flap checks q1, ASA 81mg, Decadron 10 q8 x3  post op. Afrin  Q12  CV: Goal MAP> 70 HD stable LR @125 Avoid pressors, Home Coreg & Norvasc - held post operatively   Pulm: Satting well on NC  GI: NPO, Doboff, Protonix, TF to start at goal at 8pm   : TOV pending, Hx of BPH, Flomax via NGT         ID: hx of Cdiff:  PO Vanco ( 7/14-). Post op PPX: Ancef( 7/14-)  Flagy l( 7/14 - )   Endo: ISS  ppx: SCD, SQH  Lines: PIV [place]( 7/14-)   Wounds: Wound vac in Rt Calf. IRVING x 1 in rt calf. IRVING x1 in neck.   PT/OT: Ordered   71M PMH depression, HL, HTN, Prostate ca, ethmoidectomy, hernia repair. Dx w/ R maxillary ameloblastoma ~May2021, admitted for rt Maxillectomy, reconstruction w/ Rt fibula free flap and rSTSG.    Neuro: Zoloft, Dilaudid + Tylenol PRN   HEENT: Flap checks q1, ASA 81mg, Decadron 10 q8 x3  post op. Afrin  Q12  CV: Goal MAP> 70 HD stable LR @125 Avoid pressors, Home Coreg & Norvasc - held post operatively   Pulm: Satting well on 4L NC   GI: NPO, Doboff, Protonix, TF to start at goal at 8pm   : TOV pending, Hx of BPH, Flomax via NGT         ID: hx of Cdiff:  PO Vanco ( 7/14-). Post op PPX: Ancef( 7/14-)  Flagy l( 7/14 - )   Endo: ISS  ppx: SCD, SQH  Lines: PIV [place]( 7/14-)   Wounds: Wound vac in Rt Calf. IRVING x 1 in rt calf. IRVING x1 in neck.   PT/OT: Ordered

## 2021-07-15 NOTE — PROGRESS NOTE ADULT - ASSESSMENT
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, dental implants, R fibula MVFF, R thigh STSG 7/14    #Neuro  - pain control per ICU     #ENT  - RN flap checks q1h  - s/p  OH POD0, ASA 81 via NGT daily  - SQH POD1    - Monitor IRVING drain outputs     #CV  - Monitor BP, maintain MAPs   - Avoid pressors  - 500 cc bolus NS PRN for MAP maintenance     #Resp  - O2 support as indicated    #GI  - NPO, IVF  - CXR to confirm NGT placement  - TF tonight around 8PM, obtain nutrition consult and advance to goal per nutrition recs   - Bowel regimen while receiving opiates for pain  - PPI, anti-emetics   - Monitor lytes; replace PRN    #ID  - Monitor temp, WBC  - Vanco/flagyl/kefzol  -ID consult for recent C.diff    #MSK  - Monitor leg incision   - IRVING to self suction, wound vac to suction  - OOBTC

## 2021-07-15 NOTE — PROGRESS NOTE ADULT - SUBJECTIVE AND OBJECTIVE BOX
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 Pt now s/p R maxillectomy, dental implants, R fibula MVFF, R thigh STSG 7/14.    07-15 Patient seen at bedside and discussed with attending. flap checks with strong doppler signal. nMAPs 70s-80s. no events overnight.     ALLERGIES  sulfa drugs (Unknown)    MEDICATIONS  (STANDING):  aspirin  chewable 81 milliGRAM(s) Oral daily  ceFAZolin  Injectable. 2000 milliGRAM(s) IV Push every 8 hours  dexAMETHasone  IVPB 10 milliGRAM(s) IV Intermittent every 8 hours  dextrose 40% Gel 15 Gram(s) Oral once  dextrose 5%. 1000 milliLiter(s) (50 mL/Hr) IV Continuous <Continuous>  dextrose 5%. 1000 milliLiter(s) (100 mL/Hr) IV Continuous <Continuous>  dextrose 50% Injectable 25 Gram(s) IV Push once  dextrose 50% Injectable 12.5 Gram(s) IV Push once  dextrose 50% Injectable 25 Gram(s) IV Push once  doxazosin 1 milliGRAM(s) Oral at bedtime  glucagon  Injectable 1 milliGRAM(s) IntraMuscular once  heparin   Injectable 5000 Unit(s) SubCutaneous every 8 hours  insulin lispro (ADMELOG) corrective regimen sliding scale   SubCutaneous Before meals and at bedtime  lactated ringers. 1000 milliLiter(s) (125 mL/Hr) IV Continuous <Continuous>  metroNIDAZOLE  IVPB 500 milliGRAM(s) IV Intermittent every 8 hours  oxymetazoline 0.05% Nasal Spray 2 Spray(s) Both Nostrils every 12 hours  pantoprazole  Injectable 40 milliGRAM(s) IV Push daily  sertraline 100 milliGRAM(s) Oral daily  vancomycin    Solution 125 milliGRAM(s) Oral every 12 hours    MEDICATIONS  (PRN):  HYDROmorphone  Injectable 1 milliGRAM(s) IV Push every 4 hours PRN Severe Pain (7 - 10)  HYDROmorphone  Injectable 0.5 milliGRAM(s) IV Push every 4 hours PRN Moderate Pain (4 - 6)        LABS                         9.6    12.69 )-----------( 125      ( 15 Jul 2021 06:12 )             27.9    07-15    137  |  105  |  23  ----------------------------<  182<H>  4.0   |  23  |  1.32<H>    Ca    7.9<L>      15 Jul 2021 06:12  Phos  3.9     07-15  Mg     2.5     07-15    TPro  x   /  Alb  3.3  /  TBili  x   /  DBili  x   /  AST  x   /  ALT  x   /  AlkPhos  x   07-14    LIVER FUNCTIONS - ( 14 Jul 2021 20:43 )  Alb: 3.3 g/dL / Pro: x     / ALK PHOS: x     / ALT: x     / AST: x     / GGT: x                   INs & OUTs  Drains:     07-14 @ 07:01  -  07-15 @ 07:00  --------------------------------------------------------  OUT:    Bulb (mL): 20 mL neck    Bulb (mL): 30 mL leg    VAC (Vacuum Assisted Closure) System (mL): 55 mL  Total OUT: 105 mL        VITAL SIGNS:  ICU Vital Signs Last 24 Hrs  T(C): 36.9 (15 Jul 2021 05:19), Max: 36.9 (15 Jul 2021 00:46)  T(F): 98.5 (15 Jul 2021 05:19), Max: 98.5 (15 Jul 2021 00:46)  HR: 80 (15 Jul 2021 07:00) (57 - 85)  BP: 123/62 (15 Jul 2021 07:00) (114/57 - 135/68)  BP(mean): 87 (15 Jul 2021 07:00) (78 - 96)  ABP: 111/59 (15 Jul 2021 07:00) (103/55 - 146/62)  ABP(mean): 80 (15 Jul 2021 07:00) (64 - 88)  RR: 16 (15 Jul 2021 07:00) (14 - 20)  SpO2: 95% (15 Jul 2021 07:00) (91% - 96%)  Physical Exam: NAD  R nasal cavity with nasal trumpet and NGT sutured to septum  Intraoral skin paddle warm, well-perfused, incisions c/d/i with triphasic doppler signal  Neck incisions c/d/i, IRVING with SS fluid   Breathing comfortably on face tent  R thigh STSG donor site dressed with tegederm  R fibula donor site wrapped with cast/ACE, IRVING x1 with blood, wound vac holding suction

## 2021-07-16 LAB
ANION GAP SERPL CALC-SCNC: 11 MMOL/L — SIGNIFICANT CHANGE UP (ref 5–17)
BUN SERPL-MCNC: 27 MG/DL — HIGH (ref 7–23)
CALCIUM SERPL-MCNC: 7.8 MG/DL — LOW (ref 8.4–10.5)
CHLORIDE SERPL-SCNC: 106 MMOL/L — SIGNIFICANT CHANGE UP (ref 96–108)
CO2 SERPL-SCNC: 22 MMOL/L — SIGNIFICANT CHANGE UP (ref 22–31)
CREAT SERPL-MCNC: 1.22 MG/DL — SIGNIFICANT CHANGE UP (ref 0.5–1.3)
GLUCOSE BLDC GLUCOMTR-MCNC: 184 MG/DL — HIGH (ref 70–99)
GLUCOSE BLDC GLUCOMTR-MCNC: 191 MG/DL — HIGH (ref 70–99)
GLUCOSE BLDC GLUCOMTR-MCNC: 191 MG/DL — HIGH (ref 70–99)
GLUCOSE BLDC GLUCOMTR-MCNC: 194 MG/DL — HIGH (ref 70–99)
GLUCOSE SERPL-MCNC: 189 MG/DL — HIGH (ref 70–99)
HCT VFR BLD CALC: 26.1 % — LOW (ref 39–50)
HGB BLD-MCNC: 8.6 G/DL — LOW (ref 13–17)
MAGNESIUM SERPL-MCNC: 2.4 MG/DL — SIGNIFICANT CHANGE UP (ref 1.6–2.6)
MCHC RBC-ENTMCNC: 27.8 PG — SIGNIFICANT CHANGE UP (ref 27–34)
MCHC RBC-ENTMCNC: 33 GM/DL — SIGNIFICANT CHANGE UP (ref 32–36)
MCV RBC AUTO: 84.5 FL — SIGNIFICANT CHANGE UP (ref 80–100)
NRBC # BLD: 0 /100 WBCS — SIGNIFICANT CHANGE UP (ref 0–0)
PHOSPHATE SERPL-MCNC: 3.4 MG/DL — SIGNIFICANT CHANGE UP (ref 2.5–4.5)
PLATELET # BLD AUTO: 144 K/UL — LOW (ref 150–400)
POTASSIUM SERPL-MCNC: 4.4 MMOL/L — SIGNIFICANT CHANGE UP (ref 3.5–5.3)
POTASSIUM SERPL-SCNC: 4.4 MMOL/L — SIGNIFICANT CHANGE UP (ref 3.5–5.3)
RBC # BLD: 3.09 M/UL — LOW (ref 4.2–5.8)
RBC # FLD: 14.2 % — SIGNIFICANT CHANGE UP (ref 10.3–14.5)
SODIUM SERPL-SCNC: 139 MMOL/L — SIGNIFICANT CHANGE UP (ref 135–145)
WBC # BLD: 13.54 K/UL — HIGH (ref 3.8–10.5)
WBC # FLD AUTO: 13.54 K/UL — HIGH (ref 3.8–10.5)

## 2021-07-16 PROCEDURE — 99232 SBSQ HOSP IP/OBS MODERATE 35: CPT | Mod: GC

## 2021-07-16 RX ORDER — PETROLATUM,WHITE
1 JELLY (GRAM) TOPICAL EVERY 12 HOURS
Refills: 0 | Status: DISCONTINUED | OUTPATIENT
Start: 2021-07-16 | End: 2021-07-26

## 2021-07-16 RX ORDER — ACETAMINOPHEN 500 MG
650 TABLET ORAL EVERY 6 HOURS
Refills: 0 | Status: DISCONTINUED | OUTPATIENT
Start: 2021-07-16 | End: 2021-07-16

## 2021-07-16 RX ORDER — BACITRACIN ZINC 500 UNIT/G
1 OINTMENT IN PACKET (EA) TOPICAL EVERY 12 HOURS
Refills: 0 | Status: DISCONTINUED | OUTPATIENT
Start: 2021-07-16 | End: 2021-07-23

## 2021-07-16 RX ORDER — ACETAMINOPHEN 500 MG
1000 TABLET ORAL EVERY 6 HOURS
Refills: 0 | Status: DISCONTINUED | OUTPATIENT
Start: 2021-07-16 | End: 2021-07-23

## 2021-07-16 RX ADMIN — OXYMETAZOLINE HYDROCHLORIDE 2 SPRAY(S): 0.5 SPRAY NASAL at 05:05

## 2021-07-16 RX ADMIN — Medication 81 MILLIGRAM(S): at 11:03

## 2021-07-16 RX ADMIN — Medication 2: at 23:00

## 2021-07-16 RX ADMIN — HEPARIN SODIUM 5000 UNIT(S): 5000 INJECTION INTRAVENOUS; SUBCUTANEOUS at 21:01

## 2021-07-16 RX ADMIN — Medication 1000 MILLIGRAM(S): at 12:00

## 2021-07-16 RX ADMIN — TAMSULOSIN HYDROCHLORIDE 0.8 MILLIGRAM(S): 0.4 CAPSULE ORAL at 21:02

## 2021-07-16 RX ADMIN — CHLORHEXIDINE GLUCONATE 15 MILLILITER(S): 213 SOLUTION TOPICAL at 05:05

## 2021-07-16 RX ADMIN — CHLORHEXIDINE GLUCONATE 1 APPLICATION(S): 213 SOLUTION TOPICAL at 05:06

## 2021-07-16 RX ADMIN — Medication 2000 MILLIGRAM(S): at 13:22

## 2021-07-16 RX ADMIN — HYDROMORPHONE HYDROCHLORIDE 0.5 MILLIGRAM(S): 2 INJECTION INTRAMUSCULAR; INTRAVENOUS; SUBCUTANEOUS at 18:00

## 2021-07-16 RX ADMIN — OXYMETAZOLINE HYDROCHLORIDE 2 SPRAY(S): 0.5 SPRAY NASAL at 18:00

## 2021-07-16 RX ADMIN — Medication 1 APPLICATION(S): at 22:14

## 2021-07-16 RX ADMIN — HEPARIN SODIUM 5000 UNIT(S): 5000 INJECTION INTRAVENOUS; SUBCUTANEOUS at 05:06

## 2021-07-16 RX ADMIN — CHLORHEXIDINE GLUCONATE 15 MILLILITER(S): 213 SOLUTION TOPICAL at 13:22

## 2021-07-16 RX ADMIN — CHLORHEXIDINE GLUCONATE 15 MILLILITER(S): 213 SOLUTION TOPICAL at 21:00

## 2021-07-16 RX ADMIN — Medication 2000 MILLIGRAM(S): at 05:05

## 2021-07-16 RX ADMIN — Medication 1000 MILLIGRAM(S): at 17:23

## 2021-07-16 RX ADMIN — Medication 1000 MILLIGRAM(S): at 11:03

## 2021-07-16 RX ADMIN — HYDROMORPHONE HYDROCHLORIDE 0.5 MILLIGRAM(S): 2 INJECTION INTRAMUSCULAR; INTRAVENOUS; SUBCUTANEOUS at 02:40

## 2021-07-16 RX ADMIN — Medication 1 APPLICATION(S): at 21:02

## 2021-07-16 RX ADMIN — Medication 2: at 11:57

## 2021-07-16 RX ADMIN — HEPARIN SODIUM 5000 UNIT(S): 5000 INJECTION INTRAVENOUS; SUBCUTANEOUS at 13:22

## 2021-07-16 RX ADMIN — HYDROMORPHONE HYDROCHLORIDE 0.5 MILLIGRAM(S): 2 INJECTION INTRAMUSCULAR; INTRAVENOUS; SUBCUTANEOUS at 00:00

## 2021-07-16 RX ADMIN — Medication 125 MILLIGRAM(S): at 05:06

## 2021-07-16 RX ADMIN — Medication 100 MILLIGRAM(S): at 05:05

## 2021-07-16 RX ADMIN — HYDROMORPHONE HYDROCHLORIDE 0.5 MILLIGRAM(S): 2 INJECTION INTRAMUSCULAR; INTRAVENOUS; SUBCUTANEOUS at 21:59

## 2021-07-16 RX ADMIN — HYDROMORPHONE HYDROCHLORIDE 1 MILLIGRAM(S): 2 INJECTION INTRAMUSCULAR; INTRAVENOUS; SUBCUTANEOUS at 06:38

## 2021-07-16 RX ADMIN — SERTRALINE 100 MILLIGRAM(S): 25 TABLET, FILM COATED ORAL at 11:03

## 2021-07-16 RX ADMIN — PANTOPRAZOLE SODIUM 40 MILLIGRAM(S): 20 TABLET, DELAYED RELEASE ORAL at 11:03

## 2021-07-16 RX ADMIN — HYDROMORPHONE HYDROCHLORIDE 0.5 MILLIGRAM(S): 2 INJECTION INTRAMUSCULAR; INTRAVENOUS; SUBCUTANEOUS at 02:08

## 2021-07-16 RX ADMIN — Medication 2000 MILLIGRAM(S): at 21:01

## 2021-07-16 RX ADMIN — Medication 2: at 17:23

## 2021-07-16 RX ADMIN — Medication 100 MILLIGRAM(S): at 21:02

## 2021-07-16 RX ADMIN — Medication 1000 MILLIGRAM(S): at 18:30

## 2021-07-16 RX ADMIN — HYDROMORPHONE HYDROCHLORIDE 1 MILLIGRAM(S): 2 INJECTION INTRAMUSCULAR; INTRAVENOUS; SUBCUTANEOUS at 05:32

## 2021-07-16 RX ADMIN — Medication 2: at 06:27

## 2021-07-16 RX ADMIN — Medication 125 MILLIGRAM(S): at 17:23

## 2021-07-16 RX ADMIN — Medication 100 MILLIGRAM(S): at 13:22

## 2021-07-16 RX ADMIN — Medication 1000 MILLIGRAM(S): at 23:00

## 2021-07-16 NOTE — PROGRESS NOTE ADULT - ATTENDING COMMENTS
Hemodynamically stable however with urinary retention, for now to straight cath as needed.
Breathing comfortably with good perfusion of flap and peripherally. Continue ancef/flagyl. JEANETTE medel.

## 2021-07-16 NOTE — PHYSICAL THERAPY INITIAL EVALUATION ADULT - IMPAIRMENTS CONTRIBUTING TO GAIT DEVIATIONS, PT EVAL
**primarily pain-limited through (R)wrist 2/2 radial a-line and dorsal IV hep lock/impaired balance/decreased flexibility/pain/impaired postural control/decreased ROM/decreased strength

## 2021-07-16 NOTE — PROGRESS NOTE ADULT - SUBJECTIVE AND OBJECTIVE BOX
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 Pt now s/p R maxillectomy, dental implants, R fibula MVFF, R thigh STSG 7/14.    07-15 Patient seen at bedside and discussed with attending. flap checks with strong doppler signal. nMAPs 70s-80s. no events overnight.   07-16 Patient seen at bedside and discussed with attending. no acute events overnight. flap checks with strong doppler. failed TOV, getting straight cath. tolerating diet.     ALLERGIES  sulfa drugs (Unknown)    MEDICATIONS  (STANDING):  aspirin  chewable 81 milliGRAM(s) Oral daily  ceFAZolin  Injectable. 2000 milliGRAM(s) IV Push every 8 hours  chlorhexidine 0.12% Liquid 15 milliLiter(s) Oral Mucosa <User Schedule>  chlorhexidine 2% Cloths 1 Application(s) Topical daily  dextrose 40% Gel 15 Gram(s) Oral once  dextrose 5%. 1000 milliLiter(s) (50 mL/Hr) IV Continuous <Continuous>  dextrose 5%. 1000 milliLiter(s) (100 mL/Hr) IV Continuous <Continuous>  dextrose 50% Injectable 25 Gram(s) IV Push once  dextrose 50% Injectable 12.5 Gram(s) IV Push once  dextrose 50% Injectable 25 Gram(s) IV Push once  glucagon  Injectable 1 milliGRAM(s) IntraMuscular once  heparin   Injectable 5000 Unit(s) SubCutaneous every 8 hours  insulin lispro (ADMELOG) corrective regimen sliding scale   SubCutaneous every 6 hours  melatonin liquid 1mg/1ml 3 milliGRAM(s) 3 milliGRAM(s) Oral at bedtime  metroNIDAZOLE  IVPB 500 milliGRAM(s) IV Intermittent every 8 hours  oxymetazoline 0.05% Nasal Spray 2 Spray(s) Both Nostrils every 12 hours  pantoprazole  Injectable 40 milliGRAM(s) IV Push daily  sertraline 100 milliGRAM(s) Oral daily  tamsulosin 0.8 milliGRAM(s) Oral at bedtime  vancomycin    Solution 125 milliGRAM(s) Oral every 12 hours    MEDICATIONS  (PRN):  HYDROmorphone  Injectable 1 milliGRAM(s) IV Push every 4 hours PRN Severe Pain (7 - 10)  HYDROmorphone  Injectable 0.5 milliGRAM(s) IV Push every 4 hours PRN Moderate Pain (4 - 6)        LABS                         8.6    13.54 )-----------( 144      ( 16 Jul 2021 05:53 )             26.1    07-16    139  |  106  |  27<H>  ----------------------------<  189<H>  4.4   |  22  |  1.22    Ca    7.8<L>      16 Jul 2021 05:53  Phos  3.4     07-16  Mg     2.4     07-16    TPro  x   /  Alb  3.3  /  TBili  x   /  DBili  x   /  AST  x   /  ALT  x   /  AlkPhos  x   07-14    LIVER FUNCTIONS - ( 14 Jul 2021 20:43 )  Alb: 3.3 g/dL / Pro: x     / ALK PHOS: x     / ALT: x     / AST: x     / GGT: x                   INs & OUTs  Drains:     07-15 @ 07:01  -  07-16 @ 07:00  --------------------------------------------------------  OUT:    Bulb (mL): 50 mL    Bulb (mL): 55 mL    VAC (Vacuum Assisted Closure) System (mL): 45 mL  Total OUT: 150 mL        VITAL SIGNS:  ICU Vital Signs Last 24 Hrs  T(C): 36.7 (16 Jul 2021 05:50), Max: 37.3 (15 Jul 2021 09:22)  T(F): 98 (16 Jul 2021 05:50), Max: 99.1 (15 Jul 2021 09:22)  HR: 71 (16 Jul 2021 07:00) (65 - 106)  BP: 131/63 (16 Jul 2021 07:00) (116/61 - 155/72)  BP(mean): 91 (16 Jul 2021 07:00) (83 - 105)  ABP: 133/62 (16 Jul 2021 07:00) (112/55 - 175/66)  ABP(mean): 84 (16 Jul 2021 07:00) (75 - 101)  RR: 16 (16 Jul 2021 07:00) (15 - 18)  SpO2: 95% (16 Jul 2021 07:00) (91% - 98%)      Physical Exam: NAD  R nasal cavity with nasal trumpet and NGT sutured to septum  Intraoral skin paddle warm, well-perfused, incisions c/d/i with triphasic doppler signal  Neck incisions c/d/i, IRVING with SS fluid   Breathing comfortably on RA  R thigh STSG donor site dressed with tegederm  R fibula donor site wrapped with cast/ACE, IRVING x1 with blood, wound vac holding suction

## 2021-07-16 NOTE — PHYSICAL THERAPY INITIAL EVALUATION ADULT - PRECAUTIONS/LIMITATIONS, REHAB EVAL
***patient reports "he is very anxious/nervous" prior to all functional tasks/fall precautions/oxygen therapy device and L/min/surgical precautions

## 2021-07-16 NOTE — PROGRESS NOTE ADULT - ASSESSMENT
71M PMH depression, HL, HTN, Prostate ca, ethmoidectomy, hernia repair. Dx w/ R maxillary ameloblastoma ~May2021, admitted for rt Maxillectomy, reconstruction w/ Rt fibula free flap and rSTSG.    Neuro: Zoloft, Dilaudid + Tylenol ATC   HEENT: Flap checks q1, ASA 81mg, Decadron 10 q8 x3  post op. Afrin  Q12  CV: Goal MAP> 70 HD stable, Avoid pressors, Home Coreg & Norvasc - held post operatively   Pulm: Satting well on 4L NC  GI: NPO, Doboff, Protonix, TF started  : failed TOV, q6 straight cath for bladder scan>300cc, Hx of BPH, Flomax via NGT        ID: hx of Cdiff:  PO Vanco ( 7/14-). Post op PPX: Ancef( 7/14-)  Flagyl (7/14-)   Endo: ISS  ppx: SCD, SQH  Lines: PIV Xochilt( 7/14-)   Wounds: Wound vac in Rt Calf. IRVING x 1 in rt calf. IRVING x1 in neck.   PT/OT: Ordered

## 2021-07-16 NOTE — PROGRESS NOTE ADULT - ASSESSMENT
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, dental implants, R fibula MVFF, R thigh STSG 7/14    #Neuro  - pain control per ICU     #ENT  - RN flap checks q1h  - s/p  [place] POD0, ASA 81 via NGT daily  - SQH   - Monitor IRVING drain outputs       #CV  - can dc madalyn per SICU  - Monitor BP, maintain MAPs   - Avoid pressors  - 500 cc bolus NS PRN for MAP maintenance     #Resp  - O2 support as indicated    #GI  - continue tube feeds  - obtain nutrition consult and advance to goal per nutrition recs   - Bowel regimen while receiving opiates for pain  - PPI, anti-emetics   - Monitor lytes; replace PRN    #ID  - Monitor temp, WBC  - Vanco/flagyl/kefzol    #MSK  - Monitor leg incision   - IRVING to self suction, wound vac to suction  - toe touch ambulation

## 2021-07-16 NOTE — DIETITIAN INITIAL EVALUATION ADULT. - OTHER INFO
71M with hx of HTN, HLD, depression, prostate ca, presents with R maxilla ameloblastoma now s/p R maxillectomy, dental implants, R fibula MVFF, R thigh STSG 7/14. Pt transferred to SICU for further monitoring. NGT placed with plan to initiate EN feeds per disc with team- please see recs below.     On assessment, pt resting in bed comfortably. Currently NPO with EN diet. EN of Jevity 1.2 with x1 LPS daily running at 40 ml/hr increasing slowly to goal of 70 ml/hr. No reported n/v/d/c. No abd distention or discomfort. Last BM 7/13. Skin Surgical incision, maged score 17. +2 pitting edema irght leg. No pain reported. Pt noting his weight 196lbs (5/25) now admitting at 199lbs (7/14) noting a +3lb/ 1.5% weight gain over 2 months. Pt reporting good appetite PTA- denied any changes. NKFA. Education provided on need for EN feeds at this time- pt receptive. Pertinent Labs: POCT 184H, BN 27H, Glu 189H, A1C 5.5%. Cont to monitor lytes and replete prn. Please see nutr recs below. RD to follow.

## 2021-07-16 NOTE — PROGRESS NOTE ADULT - SUBJECTIVE AND OBJECTIVE BOX
STATUS POST:  Rt Maxillectomy, reconstruction w/ Rt fibula free flap Rt Thigh STSG      SUBJECTIVE: Patient seen and examined bedside. Nasal drip from day prior has improved. He states that his pain is appropriately being managed on medications. On exam today he denies n/c, CP, ab pain, SOB. No other complaints.     aspirin  chewable 81 milliGRAM(s) Oral daily  ceFAZolin  Injectable. 2000 milliGRAM(s) IV Push every 8 hours  heparin   Injectable 5000 Unit(s) SubCutaneous every 8 hours  metroNIDAZOLE  IVPB 500 milliGRAM(s) IV Intermittent every 8 hours  tamsulosin 0.8 milliGRAM(s) Oral at bedtime  vancomycin    Solution 125 milliGRAM(s) Oral every 12 hours      Vital Signs Last 24 Hrs  T(C): 37 (16 Jul 2021 09:05), Max: 37.1 (15 Jul 2021 14:00)  T(F): 98.6 (16 Jul 2021 09:05), Max: 98.8 (15 Jul 2021 14:00)  HR: 64 (16 Jul 2021 10:00) (64 - 106)  BP: 103/56 (16 Jul 2021 09:00) (103/56 - 155/72)  BP(mean): 76 (16 Jul 2021 09:00) (76 - 105)  RR: 16 (16 Jul 2021 10:00) (15 - 18)  SpO2: 95% (16 Jul 2021 10:00) (92% - 98%)  I&O's Detail    15 Jul 2021 07:01  -  16 Jul 2021 07:00  --------------------------------------------------------  IN:    Enteral Tube Flush: 440 mL    IV PiggyBack: 300 mL    Lactated Ringers: 2865 mL  Total IN: 3605 mL    OUT:    Bulb (mL): 50 mL    Bulb (mL): 55 mL    Indwelling Catheter - Urethral (mL): 430 mL    Intermittent Catheterization - Urethral (mL): 1350 mL    VAC (Vacuum Assisted Closure) System (mL): 45 mL  Total OUT: 1930 mL    Total NET: 1675 mL      16 Jul 2021 07:01  -  16 Jul 2021 11:23  --------------------------------------------------------  IN:    Enteral Tube Flush: 40 mL    Jevity 1.2: 90 mL  Total IN: 130 mL    OUT:  Total OUT: 0 mL    Total NET: 130 mL          Physical Exam:    General: NAD   Neuro: AOx3  HEENT: R nasopharyngeal airway in place with doboff through it. Intra oral incisions intact, with minimal drainage. flap pulse present and checked via doppler. AMITA drain in Right neck with serosanguinous drainage   CV: NSR, no m/r/g  Pulm: non labored breathing on 4L NC with humified air, CTAB  Abd: Soft, obese abdomen NT/ND  Ext: Edema of LLE, Right calf with ace wrap and serosanguinous amita x1. Could not palpate DP pulse in LLE but was able to find it using doppler      LABS:                        8.6    13.54 )-----------( 144      ( 16 Jul 2021 05:53 )             26.1     07-16    139  |  106  |  27<H>  ----------------------------<  189<H>  4.4   |  22  |  1.22    Ca    7.8<L>      16 Jul 2021 05:53  Phos  3.4     07-16  Mg     2.4     07-16    TPro  x   /  Alb  3.3  /  TBili  x   /  DBili  x   /  AST  x   /  ALT  x   /  AlkPhos  x   07-14          RADIOLOGY & ADDITIONAL STUDIES:   STATUS POST:  Rt Maxillectomy, reconstruction w/ Rt fibula free flap Rt Thigh STSG      SUBJECTIVE: Patient seen and examined bedside. Nasal drip from day prior has improved. He states that his pain is appropriately being managed on medications. On exam today he denies n/c, CP, ab pain, SOB. No other complaints.     aspirin  chewable 81 milliGRAM(s) Oral daily  ceFAZolin  Injectable. 2000 milliGRAM(s) IV Push every 8 hours  heparin   Injectable 5000 Unit(s) SubCutaneous every 8 hours  metroNIDAZOLE  IVPB 500 milliGRAM(s) IV Intermittent every 8 hours  tamsulosin 0.8 milliGRAM(s) Oral at bedtime  vancomycin    Solution 125 milliGRAM(s) Oral every 12 hours      Vital Signs Last 24 Hrs  T(C): 37 (16 Jul 2021 09:05), Max: 37.1 (15 Jul 2021 14:00)  T(F): 98.6 (16 Jul 2021 09:05), Max: 98.8 (15 Jul 2021 14:00)  HR: 64 (16 Jul 2021 10:00) (64 - 106)  BP: 103/56 (16 Jul 2021 09:00) (103/56 - 155/72)  BP(mean): 76 (16 Jul 2021 09:00) (76 - 105)  RR: 16 (16 Jul 2021 10:00) (15 - 18)  SpO2: 95% (16 Jul 2021 10:00) (92% - 98%)  I&O's Detail    15 Jul 2021 07:01  -  16 Jul 2021 07:00  --------------------------------------------------------  IN:    Enteral Tube Flush: 440 mL    IV PiggyBack: 300 mL    Lactated Ringers: 2865 mL  Total IN: 3605 mL    OUT:    Bulb (mL): 50 mL    Bulb (mL): 55 mL    Indwelling Catheter - Urethral (mL): 430 mL    Intermittent Catheterization - Urethral (mL): 1350 mL    VAC (Vacuum Assisted Closure) System (mL): 45 mL  Total OUT: 1930 mL    Total NET: 1675 mL      16 Jul 2021 07:01  -  16 Jul 2021 11:23  --------------------------------------------------------  IN:    Enteral Tube Flush: 40 mL    Jevity 1.2: 90 mL  Total IN: 130 mL    OUT:  Total OUT: 0 mL    Total NET: 130 mL          Physical Exam:    General: NAD   Neuro: AOx3  HEENT: R nasopharyngeal airway in place with doboff through it. Intra oral incisions intact, with minimal drainage. flap pulse present and checked via doppler. AMITA drain in Right neck with serosanguinous drainage   CV: NSR, no m/r/g  Pulm: non labored breathing on 4L NC with humified air, CTAB  Abd: Soft, obese abdomen NT/ND  Ext/vasc: Edema of LLE, Right calf with ace wrap and serosanguinous amita x1. Could not palpate DP pulse in LLE but was able to find it using doppler  Skin: no rash  Psych: affect appropriate        LABS:                        8.6    13.54 )-----------( 144      ( 16 Jul 2021 05:53 )             26.1     07-16    139  |  106  |  27<H>  ----------------------------<  189<H>  4.4   |  22  |  1.22    Ca    7.8<L>      16 Jul 2021 05:53  Phos  3.4     07-16  Mg     2.4     07-16    TPro  x   /  Alb  3.3  /  TBili  x   /  DBili  x   /  AST  x   /  ALT  x   /  AlkPhos  x   07-14          RADIOLOGY & ADDITIONAL STUDIES:

## 2021-07-16 NOTE — PHYSICAL THERAPY INITIAL EVALUATION ADULT - NS ASR WT BEARING DETAIL RLE
Patient has CAM boot at bedside although continues with (R)LE splint and unable to don CAM boot at this time/toe touch weight-bearing

## 2021-07-16 NOTE — PHYSICAL THERAPY INITIAL EVALUATION ADULT - PHYSICAL ASSIST/NONPHYSICAL ASSIST: SIT/STAND, REHAB EVAL
slightly unsteady however no LOB/knee buckling noted/verbal cues/nonverbal cues (demo/gestures)/2 person assist

## 2021-07-16 NOTE — PHYSICAL THERAPY INITIAL EVALUATION ADULT - GENERAL OBSERVATIONS, REHAB EVAL
PT IE completed. Chart reviewed. Patient with complaints of 3/10 cervical pain at rest, although remains agreeable to PT. Patient received semi-supine, NAD, +tele, +4LO2NC, +(R)cervical IRVING intact, +(R)nasal trumpet intact, +NGT (off feed), +(R)Radial a-line, +IV hep lock, +(R)STSG dressing intact, +(R)leg IRVING intact, +(R)LE ace wrapping/splinting intact, +(R)LE wound vac intact, CHRISTA Oneil cleared patient for treatment.

## 2021-07-16 NOTE — PHYSICAL THERAPY INITIAL EVALUATION ADULT - PATIENT PROFILE REVIEW, REHAB EVAL
yes Rifampin Pregnancy And Lactation Text: This medication is Pregnancy Category C and it isn't know if it is safe during pregnancy. It is also excreted in breast milk and should not be used if you are breast feeding.

## 2021-07-16 NOTE — PHYSICAL THERAPY INITIAL EVALUATION ADULT - PERTINENT HX OF CURRENT PROBLEM, REHAB EVAL
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 Pt now s/p R maxillectomy, dental implants, R fibula MVFF, R thigh STSG 7/14. Please refer to H&P on [place] for remaining.

## 2021-07-16 NOTE — PHYSICAL THERAPY INITIAL EVALUATION ADULT - LIVES WITH, PROFILE
in house, ~3 steps (no railing) -OR- 1 flight to enter (with railing) pending choice of entrance // single level home inside/alone

## 2021-07-16 NOTE — PHYSICAL THERAPY INITIAL EVALUATION ADULT - GAIT DEVIATIONS NOTED, PT EVAL
slightly unsteady gait, no LOB/knee buckling noted; *increased time required with turning and negotiating rolling walker/decreased danielito/decreased velocity of limb motion/decreased step length/decreased weight-shifting ability

## 2021-07-16 NOTE — PHYSICAL THERAPY INITIAL EVALUATION ADULT - ADDITIONAL COMMENTS
Patient reports previously independent with all ADLs/IADLs prior to admission. No HHA. Denies history of mechanical falls.

## 2021-07-17 LAB
ANION GAP SERPL CALC-SCNC: 11 MMOL/L — SIGNIFICANT CHANGE UP (ref 5–17)
BLD GP AB SCN SERPL QL: NEGATIVE — SIGNIFICANT CHANGE UP
BUN SERPL-MCNC: 35 MG/DL — HIGH (ref 7–23)
CALCIUM SERPL-MCNC: 7.1 MG/DL — LOW (ref 8.4–10.5)
CHLORIDE SERPL-SCNC: 109 MMOL/L — HIGH (ref 96–108)
CO2 SERPL-SCNC: 21 MMOL/L — LOW (ref 22–31)
CREAT SERPL-MCNC: 1.27 MG/DL — SIGNIFICANT CHANGE UP (ref 0.5–1.3)
GLUCOSE BLDC GLUCOMTR-MCNC: 162 MG/DL — HIGH (ref 70–99)
GLUCOSE BLDC GLUCOMTR-MCNC: 169 MG/DL — HIGH (ref 70–99)
GLUCOSE BLDC GLUCOMTR-MCNC: 235 MG/DL — HIGH (ref 70–99)
GLUCOSE SERPL-MCNC: 208 MG/DL — HIGH (ref 70–99)
HCT VFR BLD CALC: 23.2 % — LOW (ref 39–50)
HCT VFR BLD CALC: 25 % — LOW (ref 39–50)
HGB BLD-MCNC: 7.4 G/DL — LOW (ref 13–17)
HGB BLD-MCNC: 8.1 G/DL — LOW (ref 13–17)
MAGNESIUM SERPL-MCNC: 2.6 MG/DL — SIGNIFICANT CHANGE UP (ref 1.6–2.6)
MCHC RBC-ENTMCNC: 27.6 PG — SIGNIFICANT CHANGE UP (ref 27–34)
MCHC RBC-ENTMCNC: 28.1 PG — SIGNIFICANT CHANGE UP (ref 27–34)
MCHC RBC-ENTMCNC: 31.9 GM/DL — LOW (ref 32–36)
MCHC RBC-ENTMCNC: 32.4 GM/DL — SIGNIFICANT CHANGE UP (ref 32–36)
MCV RBC AUTO: 86.6 FL — SIGNIFICANT CHANGE UP (ref 80–100)
MCV RBC AUTO: 86.8 FL — SIGNIFICANT CHANGE UP (ref 80–100)
NRBC # BLD: 0 /100 WBCS — SIGNIFICANT CHANGE UP (ref 0–0)
NRBC # BLD: 0 /100 WBCS — SIGNIFICANT CHANGE UP (ref 0–0)
PHOSPHATE SERPL-MCNC: 3.1 MG/DL — SIGNIFICANT CHANGE UP (ref 2.5–4.5)
PLATELET # BLD AUTO: 119 K/UL — LOW (ref 150–400)
PLATELET # BLD AUTO: 134 K/UL — LOW (ref 150–400)
POTASSIUM SERPL-MCNC: 4 MMOL/L — SIGNIFICANT CHANGE UP (ref 3.5–5.3)
POTASSIUM SERPL-SCNC: 4 MMOL/L — SIGNIFICANT CHANGE UP (ref 3.5–5.3)
RBC # BLD: 2.68 M/UL — LOW (ref 4.2–5.8)
RBC # BLD: 2.88 M/UL — LOW (ref 4.2–5.8)
RBC # FLD: 14.2 % — SIGNIFICANT CHANGE UP (ref 10.3–14.5)
RBC # FLD: 14.2 % — SIGNIFICANT CHANGE UP (ref 10.3–14.5)
RH IG SCN BLD-IMP: POSITIVE — SIGNIFICANT CHANGE UP
SODIUM SERPL-SCNC: 141 MMOL/L — SIGNIFICANT CHANGE UP (ref 135–145)
WBC # BLD: 10.81 K/UL — HIGH (ref 3.8–10.5)
WBC # BLD: 12.27 K/UL — HIGH (ref 3.8–10.5)
WBC # FLD AUTO: 10.81 K/UL — HIGH (ref 3.8–10.5)
WBC # FLD AUTO: 12.27 K/UL — HIGH (ref 3.8–10.5)

## 2021-07-17 PROCEDURE — 99233 SBSQ HOSP IP/OBS HIGH 50: CPT | Mod: GC

## 2021-07-17 RX ORDER — SENNA PLUS 8.6 MG/1
10 TABLET ORAL AT BEDTIME
Refills: 0 | Status: DISCONTINUED | OUTPATIENT
Start: 2021-07-17 | End: 2021-07-23

## 2021-07-17 RX ORDER — CARVEDILOL PHOSPHATE 80 MG/1
6.25 CAPSULE, EXTENDED RELEASE ORAL EVERY 12 HOURS
Refills: 0 | Status: DISCONTINUED | OUTPATIENT
Start: 2021-07-17 | End: 2021-07-23

## 2021-07-17 RX ORDER — AMLODIPINE BESYLATE 2.5 MG/1
2.5 TABLET ORAL DAILY
Refills: 0 | Status: DISCONTINUED | OUTPATIENT
Start: 2021-07-17 | End: 2021-07-18

## 2021-07-17 RX ORDER — POLYETHYLENE GLYCOL 3350 17 G/17G
17 POWDER, FOR SOLUTION ORAL DAILY
Refills: 0 | Status: DISCONTINUED | OUTPATIENT
Start: 2021-07-17 | End: 2021-07-23

## 2021-07-17 RX ADMIN — HYDROMORPHONE HYDROCHLORIDE 0.5 MILLIGRAM(S): 2 INJECTION INTRAMUSCULAR; INTRAVENOUS; SUBCUTANEOUS at 03:12

## 2021-07-17 RX ADMIN — Medication 1 APPLICATION(S): at 17:28

## 2021-07-17 RX ADMIN — Medication 1000 MILLIGRAM(S): at 00:04

## 2021-07-17 RX ADMIN — Medication 100 MILLIGRAM(S): at 05:53

## 2021-07-17 RX ADMIN — OXYMETAZOLINE HYDROCHLORIDE 2 SPRAY(S): 0.5 SPRAY NASAL at 05:51

## 2021-07-17 RX ADMIN — Medication 2: at 11:33

## 2021-07-17 RX ADMIN — Medication 125 MILLIGRAM(S): at 05:51

## 2021-07-17 RX ADMIN — Medication 1000 MILLIGRAM(S): at 11:06

## 2021-07-17 RX ADMIN — Medication 2: at 17:55

## 2021-07-17 RX ADMIN — POLYETHYLENE GLYCOL 3350 17 GRAM(S): 17 POWDER, FOR SOLUTION ORAL at 09:09

## 2021-07-17 RX ADMIN — HYDROMORPHONE HYDROCHLORIDE 0.5 MILLIGRAM(S): 2 INJECTION INTRAMUSCULAR; INTRAVENOUS; SUBCUTANEOUS at 19:22

## 2021-07-17 RX ADMIN — CHLORHEXIDINE GLUCONATE 1 APPLICATION(S): 213 SOLUTION TOPICAL at 05:54

## 2021-07-17 RX ADMIN — CHLORHEXIDINE GLUCONATE 15 MILLILITER(S): 213 SOLUTION TOPICAL at 05:51

## 2021-07-17 RX ADMIN — CHLORHEXIDINE GLUCONATE 15 MILLILITER(S): 213 SOLUTION TOPICAL at 14:00

## 2021-07-17 RX ADMIN — SENNA PLUS 10 MILLILITER(S): 8.6 TABLET ORAL at 21:50

## 2021-07-17 RX ADMIN — HYDROMORPHONE HYDROCHLORIDE 1 MILLIGRAM(S): 2 INJECTION INTRAMUSCULAR; INTRAVENOUS; SUBCUTANEOUS at 21:58

## 2021-07-17 RX ADMIN — HYDROMORPHONE HYDROCHLORIDE 0.5 MILLIGRAM(S): 2 INJECTION INTRAMUSCULAR; INTRAVENOUS; SUBCUTANEOUS at 10:00

## 2021-07-17 RX ADMIN — SERTRALINE 100 MILLIGRAM(S): 25 TABLET, FILM COATED ORAL at 11:06

## 2021-07-17 RX ADMIN — OXYMETAZOLINE HYDROCHLORIDE 2 SPRAY(S): 0.5 SPRAY NASAL at 17:29

## 2021-07-17 RX ADMIN — HYDROMORPHONE HYDROCHLORIDE 0.5 MILLIGRAM(S): 2 INJECTION INTRAMUSCULAR; INTRAVENOUS; SUBCUTANEOUS at 05:46

## 2021-07-17 RX ADMIN — Medication 1000 MILLIGRAM(S): at 06:24

## 2021-07-17 RX ADMIN — HYDROMORPHONE HYDROCHLORIDE 0.5 MILLIGRAM(S): 2 INJECTION INTRAMUSCULAR; INTRAVENOUS; SUBCUTANEOUS at 14:40

## 2021-07-17 RX ADMIN — Medication 2000 MILLIGRAM(S): at 21:50

## 2021-07-17 RX ADMIN — PANTOPRAZOLE SODIUM 40 MILLIGRAM(S): 20 TABLET, DELAYED RELEASE ORAL at 11:06

## 2021-07-17 RX ADMIN — Medication 4: at 07:04

## 2021-07-17 RX ADMIN — Medication 1000 MILLIGRAM(S): at 18:00

## 2021-07-17 RX ADMIN — HEPARIN SODIUM 5000 UNIT(S): 5000 INJECTION INTRAVENOUS; SUBCUTANEOUS at 05:51

## 2021-07-17 RX ADMIN — HYDROMORPHONE HYDROCHLORIDE 0.5 MILLIGRAM(S): 2 INJECTION INTRAMUSCULAR; INTRAVENOUS; SUBCUTANEOUS at 09:07

## 2021-07-17 RX ADMIN — Medication 100 MILLIGRAM(S): at 14:41

## 2021-07-17 RX ADMIN — Medication 81 MILLIGRAM(S): at 11:06

## 2021-07-17 RX ADMIN — HYDROMORPHONE HYDROCHLORIDE 0.5 MILLIGRAM(S): 2 INJECTION INTRAMUSCULAR; INTRAVENOUS; SUBCUTANEOUS at 15:06

## 2021-07-17 RX ADMIN — HEPARIN SODIUM 5000 UNIT(S): 5000 INJECTION INTRAVENOUS; SUBCUTANEOUS at 14:00

## 2021-07-17 RX ADMIN — Medication 1000 MILLIGRAM(S): at 05:51

## 2021-07-17 RX ADMIN — AMLODIPINE BESYLATE 2.5 MILLIGRAM(S): 2.5 TABLET ORAL at 10:22

## 2021-07-17 RX ADMIN — Medication 100 MILLIGRAM(S): at 21:51

## 2021-07-17 RX ADMIN — HYDROMORPHONE HYDROCHLORIDE 0.5 MILLIGRAM(S): 2 INJECTION INTRAMUSCULAR; INTRAVENOUS; SUBCUTANEOUS at 20:18

## 2021-07-17 RX ADMIN — Medication 2000 MILLIGRAM(S): at 14:00

## 2021-07-17 RX ADMIN — Medication 1 APPLICATION(S): at 10:22

## 2021-07-17 RX ADMIN — CARVEDILOL PHOSPHATE 6.25 MILLIGRAM(S): 80 CAPSULE, EXTENDED RELEASE ORAL at 21:50

## 2021-07-17 RX ADMIN — Medication 1000 MILLIGRAM(S): at 17:30

## 2021-07-17 RX ADMIN — HEPARIN SODIUM 5000 UNIT(S): 5000 INJECTION INTRAVENOUS; SUBCUTANEOUS at 21:51

## 2021-07-17 RX ADMIN — TAMSULOSIN HYDROCHLORIDE 0.8 MILLIGRAM(S): 0.4 CAPSULE ORAL at 21:50

## 2021-07-17 RX ADMIN — CHLORHEXIDINE GLUCONATE 15 MILLILITER(S): 213 SOLUTION TOPICAL at 21:50

## 2021-07-17 RX ADMIN — CARVEDILOL PHOSPHATE 6.25 MILLIGRAM(S): 80 CAPSULE, EXTENDED RELEASE ORAL at 09:07

## 2021-07-17 RX ADMIN — Medication 2000 MILLIGRAM(S): at 05:51

## 2021-07-17 RX ADMIN — Medication 125 MILLIGRAM(S): at 17:30

## 2021-07-17 RX ADMIN — Medication 1000 MILLIGRAM(S): at 11:45

## 2021-07-17 RX ADMIN — Medication 1 APPLICATION(S): at 05:52

## 2021-07-17 NOTE — PROGRESS NOTE ADULT - ASSESSMENT
Assessment: 70 y/o M with past medical history significant for depression, HLD, HTN, Prostate ca with past surgical history significant for ethmoidectomy and hernia repair. 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 Transferred to SICU post-op extubated on NC for close hemodynamic monitoring and flap checks. Has continued to make great progress, following ENT flap pathway. Still requiring q6h bladder scan for urinary retention, hypertensive this AM and restarted home medications.     Plan:   Neuro: Zoloft, Dilaudid PRN fl + Tylenol ATC   HEENT: Flap checks q2, ASA 81mg, s/p Decadron 10 q8 x3 doses post op; Afrin BID, Bacitracin to nares, vasline to lips BID  CV: Goal MAP>65; HD stable, Avoid pressors, Restarted home Coreg 6.25 BID, Norvasc 2.5 qd; .   Pulm: Satting well on 4L NC  GI: NPO, DHT/TF Jevity 1.2@70ml/hr, free water 250ml q6h, PPI; senna, miralax, prn suppository  : failed TOV, q6 straight cath for bladder scan>300cc, Hx of BPH, Flomax via NGT        ID: hx of Cdiff:  PO Vanco ( 7/14-). Post op PPX: Ancef( 7/14-)  Flagyl (7/14-)   Endo: ISS  ppx: SCD, SQH  Lines: PIVs, Xochilt(7/14-)   Wounds: Wound vac in Rt Calf. IRVING x 1 in rt calf. IRVING x1 in neck.   PT/OT: Ordered  Dispo: SICU

## 2021-07-17 NOTE — PROGRESS NOTE ADULT - SUBJECTIVE AND OBJECTIVE BOX
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 Pt now s/p R maxillectomy, dental implants, R fibula MVFF, R thigh STSG 7/14.    07-15 Patient seen at bedside and discussed with attending. flap checks with strong doppler signal. nMAPs 70s-80s. no events overnight.   07-16 Patient seen at bedside and discussed with attending. no acute events overnight. flap checks with strong doppler. failed TOV, getting straight cath. tolerating diet.   07-17 Patient seen at bedside and discussed with attending. flap check strong doppler signal. MAPs at goal. continues to get straight cath 2/2 unable to void.     ALLERGIES  sulfa drugs (Unknown)    MEDICATIONS  (STANDING):  acetaminophen    Suspension .. 1000 milliGRAM(s) Oral every 6 hours  amLODIPine   Tablet 2.5 milliGRAM(s) Oral daily  aspirin  chewable 81 milliGRAM(s) Oral daily  BACItracin   Ointment 1 Application(s) Topical every 12 hours  carvedilol 6.25 milliGRAM(s) Oral every 12 hours  ceFAZolin  Injectable. 2000 milliGRAM(s) IV Push every 8 hours  chlorhexidine 0.12% Liquid 15 milliLiter(s) Oral Mucosa <User Schedule>  chlorhexidine 2% Cloths 1 Application(s) Topical daily  dextrose 40% Gel 15 Gram(s) Oral once  dextrose 5%. 1000 milliLiter(s) (50 mL/Hr) IV Continuous <Continuous>  dextrose 5%. 1000 milliLiter(s) (100 mL/Hr) IV Continuous <Continuous>  dextrose 50% Injectable 25 Gram(s) IV Push once  dextrose 50% Injectable 12.5 Gram(s) IV Push once  dextrose 50% Injectable 25 Gram(s) IV Push once  glucagon  Injectable 1 milliGRAM(s) IntraMuscular once  heparin   Injectable 5000 Unit(s) SubCutaneous every 8 hours  insulin lispro (ADMELOG) corrective regimen sliding scale   SubCutaneous every 6 hours  melatonin liquid 1mg/1ml 3 milliGRAM(s) 3 milliGRAM(s) Oral at bedtime  metroNIDAZOLE  IVPB 500 milliGRAM(s) IV Intermittent every 8 hours  oxymetazoline 0.05% Nasal Spray 2 Spray(s) Both Nostrils every 12 hours  pantoprazole  Injectable 40 milliGRAM(s) IV Push daily  petrolatum white Ointment 1 Application(s) Topical every 12 hours  polyethylene glycol 3350 17 Gram(s) Oral daily  senna Syrup 10 milliLiter(s) Oral at bedtime  sertraline 100 milliGRAM(s) Oral daily  tamsulosin 0.8 milliGRAM(s) Oral at bedtime  vancomycin    Solution 125 milliGRAM(s) Oral every 12 hours    MEDICATIONS  (PRN):  bisacodyl Suppository 10 milliGRAM(s) Rectal daily PRN Constipation  HYDROmorphone  Injectable 1 milliGRAM(s) IV Push every 4 hours PRN Severe Pain (7 - 10)  HYDROmorphone  Injectable 0.5 milliGRAM(s) IV Push every 4 hours PRN Moderate Pain (4 - 6)        LABS                         7.4    10.81 )-----------( 119      ( 17 Jul 2021 07:12 )             23.2    07-17    141  |  109<H>  |  35<H>  ----------------------------<  208<H>  4.0   |  21<L>  |  1.27    Ca    7.1<L>      17 Jul 2021 07:12  Phos  3.1     07-17  Mg     2.6     07-17                INs & OUTs  Drains:     07-16 @ 07:01  -  07-17 @ 07:00  --------------------------------------------------------  OUT:    Bulb (mL): 55 mL    Bulb (mL): 30 mL    VAC (Vacuum Assisted Closure) System (mL): 0 mL  Total OUT: 85 mL        VITAL SIGNS:  ICU Vital Signs Last 24 Hrs  T(C): 36.8 (17 Jul 2021 05:00), Max: 37.2 (16 Jul 2021 17:59)  T(F): 98.2 (17 Jul 2021 05:00), Max: 99 (16 Jul 2021 17:59)  HR: 62 (17 Jul 2021 06:00) (54 - 87)  BP: 118/61 (16 Jul 2021 14:35) (103/56 - 136/73)  BP(mean): 84 (16 Jul 2021 14:35) (76 - 99)  ABP: 146/57 (17 Jul 2021 06:00) (116/52 - 157/66)  ABP(mean): 87 (17 Jul 2021 06:00) (71 - 95)  RR: 16 (17 Jul 2021 06:00) (14 - 20)  SpO2: 96% (17 Jul 2021 06:00) (94% - 96%)        Physical Exam: NAD  R nasal cavity with nasal trumpet and NGT sutured to septum  Intraoral skin paddle warm, well-perfused, incisions c/d/i with triphasic doppler signal  Neck incisions c/d/i, IRVING with SS fluid   Breathing comfortably on RA  R thigh STSG donor site dressed with tegederm  R fibula donor site wrapped with cast/ACE, IRVING x1 with blood, wound vac holding suction

## 2021-07-17 NOTE — PROGRESS NOTE ADULT - SUBJECTIVE AND OBJECTIVE BOX
24 Hour Events: POD#3 R maxillectomy      PAST MEDICAL & SURGICAL HISTORY:  Cancer of prostate  History of hormone therapy  testosterone blockers-May 2021  Depression, unspecified depression type  Hypertension, unspecified type  Hyperlipidemia, unspecified hyperlipidemia type  H/O gastroesophageal reflux (GERD)  Benign neoplasm  Clostridium difficile diarrhea-JAn 2021  Inguinal hernia, right  History of umbilical hernia repair  History of endoscopic sinus surgery      Allergies  sulfa drugs (Unknown)        MEDICATIONS  (STANDING):  acetaminophen    Suspension .. 1000 milliGRAM(s) Oral every 6 hours  amLODIPine   Tablet 2.5 milliGRAM(s) Oral daily  aspirin  chewable 81 milliGRAM(s) Oral daily  BACItracin   Ointment 1 Application(s) Topical every 12 hours  carvedilol 6.25 milliGRAM(s) Oral every 12 hours  ceFAZolin  Injectable. 2000 milliGRAM(s) IV Push every 8 hours  chlorhexidine 0.12% Liquid 15 milliLiter(s) Oral Mucosa <User Schedule>  chlorhexidine 2% Cloths 1 Application(s) Topical daily  dextrose 40% Gel 15 Gram(s) Oral once  dextrose 5%. 1000 milliLiter(s) (50 mL/Hr) IV Continuous <Continuous>  dextrose 5%. 1000 milliLiter(s) (100 mL/Hr) IV Continuous <Continuous>  dextrose 50% Injectable 25 Gram(s) IV Push once  dextrose 50% Injectable 12.5 Gram(s) IV Push once  dextrose 50% Injectable 25 Gram(s) IV Push once  glucagon  Injectable 1 milliGRAM(s) IntraMuscular once  heparin   Injectable 5000 Unit(s) SubCutaneous every 8 hours  insulin lispro (ADMELOG) corrective regimen sliding scale   SubCutaneous every 6 hours  melatonin liquid 1mg/1ml 3 milliGRAM(s) 3 milliGRAM(s) Oral at bedtime  metroNIDAZOLE  IVPB 500 milliGRAM(s) IV Intermittent every 8 hours  oxymetazoline 0.05% Nasal Spray 2 Spray(s) Both Nostrils every 12 hours  pantoprazole  Injectable 40 milliGRAM(s) IV Push daily  petrolatum white Ointment 1 Application(s) Topical every 12 hours  polyethylene glycol 3350 17 Gram(s) Oral daily  senna Syrup 10 milliLiter(s) Oral at bedtime  sertraline 100 milliGRAM(s) Oral daily  tamsulosin 0.8 milliGRAM(s) Oral at bedtime  vancomycin    Solution 125 milliGRAM(s) Oral every 12 hours    MEDICATIONS  (PRN):  bisacodyl Suppository 10 milliGRAM(s) Rectal daily PRN Constipation  HYDROmorphone  Injectable 1 milliGRAM(s) IV Push every 4 hours PRN Severe Pain (7 - 10)  HYDROmorphone  Injectable 0.5 milliGRAM(s) IV Push every 4 hours PRN Moderate Pain (4 - 6)        Physical Exam:   General: Well apperaing, resting comfortably in bed in no acute distress   Neuro: Grossly intact bilaterally   HEENT: Normocephalic, atraumatic, trachea midline, no JVD   Heart: Regular S1/S2, no murmurs rubs or gallops   Lungs: Unlabored breathing on ***; Clear to auscultation bilaterally, no adventitious sounds   Abdomen: Soft, non-distended, normoactive bowel sounds throughout, no tenderness to palpation in all 4 quadrants   Upper Extremities: No edema, freely mobile bilaterally   Lower Extremities: No edema, SCDs in place, feet warm bilaterally   Skin: Warm, non-diaphoretic throughout       Labs:              8.1    12.27 )-----------( 134      ( 17 Jul 2021 09:07 )             25.0     07-17    141  |  109<H>  |  35<H>  ----------------------------<  208<H>  4.0   |  21<L>  |  1.27    Ca    7.1<L>      17 Jul 2021 07:12  Phos  3.1     07-17  Mg     2.6     07-17      CAPILLARY BLOOD GLUCOSE  POCT Blood Glucose.: 162 mg/dL (17 Jul 2021 11:07)  POCT Blood Glucose.: 235 mg/dL (17 Jul 2021 06:51)  POCT Blood Glucose.: 194 mg/dL (16 Jul 2021 22:50)  POCT Blood Glucose.: 191 mg/dL (16 Jul 2021 17:09)      Vital Signs:   Vital Signs Last 24 Hrs  T(C): 36.8 (17 Jul 2021 05:00), Max: 37.2 (16 Jul 2021 17:59)  T(F): 98.2 (17 Jul 2021 05:00), Max: 99 (16 Jul 2021 17:59)  HR: 68 (17 Jul 2021 11:27) (54 - 87)  BP: 118/61 (16 Jul 2021 14:35) (118/61 - 136/73)  BP(mean): 84 (16 Jul 2021 14:35) (84 - 99)  RR: 18 (17 Jul 2021 11:27) (14 - 20)  SpO2: 95% (17 Jul 2021 11:27) (93% - 96%)      Input/Output:   I&O's Detail    16 Jul 2021 07:01  -  17 Jul 2021 07:00  --------------------------------------------------------  IN:    Enteral Tube Flush: 190 mL    IV PiggyBack: 200 mL    Jevity 1.2: 1250 mL  Total IN: 1640 mL    OUT:    Bulb (mL): 55 mL    Bulb (mL): 30 mL    Intermittent Catheterization - Urethral (mL): 650 mL    VAC (Vacuum Assisted Closure) System (mL): 0 mL  Total OUT: 735 mL    Total NET: 905 mL      17 Jul 2021 07:01  -  17 Jul 2021 11:53  --------------------------------------------------------  IN:    Enteral Tube Flush: 50 mL    Jevity 1.2: 210 mL  Total IN: 260 mL    OUT:    Intermittent Catheterization - Urethral (mL): 700 mL  Total OUT: 700 mL    Total NET: -440 mL        Daily     Daily      24 Hour Events: POD#3 R maxillectomy with R thigh STSG, R fibular Free Flap. Yesterday IV fluid discontinued, continued to have urinary retention requiring intermittent straight cath for bladder scan >300ml. Flap checks changed to q2h. Hypertensive this AM- restarted home amlodipine and coreg via DHT.       PAST MEDICAL & SURGICAL HISTORY:  Cancer of prostate  History of hormone therapy  testosterone blockers-May 2021  Depression, unspecified depression type  Hypertension, unspecified type  Hyperlipidemia, unspecified hyperlipidemia type  H/O gastroesophageal reflux (GERD)  Benign neoplasm  Clostridium difficile diarrhea-JAn 2021  Inguinal hernia, right  History of umbilical hernia repair  History of endoscopic sinus surgery      Allergies  sulfa drugs (Unknown)        MEDICATIONS  (STANDING):  acetaminophen    Suspension .. 1000 milliGRAM(s) Oral every 6 hours  amLODIPine   Tablet 2.5 milliGRAM(s) Oral daily  aspirin  chewable 81 milliGRAM(s) Oral daily  BACItracin   Ointment 1 Application(s) Topical every 12 hours  carvedilol 6.25 milliGRAM(s) Oral every 12 hours  ceFAZolin  Injectable. 2000 milliGRAM(s) IV Push every 8 hours  chlorhexidine 0.12% Liquid 15 milliLiter(s) Oral Mucosa <User Schedule>  chlorhexidine 2% Cloths 1 Application(s) Topical daily  dextrose 40% Gel 15 Gram(s) Oral once  dextrose 5%. 1000 milliLiter(s) (50 mL/Hr) IV Continuous <Continuous>  dextrose 5%. 1000 milliLiter(s) (100 mL/Hr) IV Continuous <Continuous>  dextrose 50% Injectable 25 Gram(s) IV Push once  dextrose 50% Injectable 12.5 Gram(s) IV Push once  dextrose 50% Injectable 25 Gram(s) IV Push once  glucagon  Injectable 1 milliGRAM(s) IntraMuscular once  heparin   Injectable 5000 Unit(s) SubCutaneous every 8 hours  insulin lispro (ADMELOG) corrective regimen sliding scale   SubCutaneous every 6 hours  melatonin liquid 1mg/1ml 3 milliGRAM(s) 3 milliGRAM(s) Oral at bedtime  metroNIDAZOLE  IVPB 500 milliGRAM(s) IV Intermittent every 8 hours  oxymetazoline 0.05% Nasal Spray 2 Spray(s) Both Nostrils every 12 hours  pantoprazole  Injectable 40 milliGRAM(s) IV Push daily  petrolatum white Ointment 1 Application(s) Topical every 12 hours  polyethylene glycol 3350 17 Gram(s) Oral daily  senna Syrup 10 milliLiter(s) Oral at bedtime  sertraline 100 milliGRAM(s) Oral daily  tamsulosin 0.8 milliGRAM(s) Oral at bedtime  vancomycin    Solution 125 milliGRAM(s) Oral every 12 hours    MEDICATIONS  (PRN):  bisacodyl Suppository 10 milliGRAM(s) Rectal daily PRN Constipation  HYDROmorphone  Injectable 1 milliGRAM(s) IV Push every 4 hours PRN Severe Pain (7 - 10)  HYDROmorphone  Injectable 0.5 milliGRAM(s) IV Push every 4 hours PRN Moderate Pain (4 - 6)        Physical Exam:   General: Well appearing resting comfortably in bed in no acute distress   Neuro: Grossly intact bilaterally   HEENT: R base of neck incision and R nasal fold incision well healing without hematoma; no ecchymosis, dopplers with biphasic wave form; trachea midline, nasal trumpet in L nare   Heart: Regular S1/S2, no murmurs rubs or gallops   Lungs: Unlabored breathing on NC; Clear to auscultation bilaterally, no adventitious sounds   Abdomen: Soft, non-distended, normoactive bowel sounds throughout, no tenderness to palpation in all 4 quadrants   Upper Extremities: No edema, freely mobile bilaterally   Lower Extremities: No edema, SCDs in place, feet warm bilaterally; RLE with ace wrap, IRVING with serosang output   Skin: Warm, non-diaphoretic throughout       Labs:              8.1    12.27 )-----------( 134      ( 17 Jul 2021 09:07 )             25.0     07-17    141  |  109<H>  |  35<H>  ----------------------------<  208<H>  4.0   |  21<L>  |  1.27    Ca    7.1<L>      17 Jul 2021 07:12  Phos  3.1     07-17  Mg     2.6     07-17      CAPILLARY BLOOD GLUCOSE  POCT Blood Glucose.: 162 mg/dL (17 Jul 2021 11:07)  POCT Blood Glucose.: 235 mg/dL (17 Jul 2021 06:51)  POCT Blood Glucose.: 194 mg/dL (16 Jul 2021 22:50)  POCT Blood Glucose.: 191 mg/dL (16 Jul 2021 17:09)      Vital Signs:   Vital Signs Last 24 Hrs  T(C): 36.8 (17 Jul 2021 05:00), Max: 37.2 (16 Jul 2021 17:59)  T(F): 98.2 (17 Jul 2021 05:00), Max: 99 (16 Jul 2021 17:59)  HR: 68 (17 Jul 2021 11:27) (54 - 87)  BP: 118/61 (16 Jul 2021 14:35) (118/61 - 136/73)  BP(mean): 84 (16 Jul 2021 14:35) (84 - 99)  RR: 18 (17 Jul 2021 11:27) (14 - 20)  SpO2: 95% (17 Jul 2021 11:27) (93% - 96%)      Input/Output:   I&O's Detail    16 Jul 2021 07:01  -  17 Jul 2021 07:00  --------------------------------------------------------  IN:    Enteral Tube Flush: 190 mL    IV PiggyBack: 200 mL    Jevity 1.2: 1250 mL  Total IN: 1640 mL    OUT:    Bulb (mL): 55 mL    Bulb (mL): 30 mL    Intermittent Catheterization - Urethral (mL): 650 mL    VAC (Vacuum Assisted Closure) System (mL): 0 mL  Total OUT: 735 mL    Total NET: 905 mL      17 Jul 2021 07:01  -  17 Jul 2021 11:53  --------------------------------------------------------  IN:    Enteral Tube Flush: 50 mL    Jevity 1.2: 210 mL  Total IN: 260 mL    OUT:    Intermittent Catheterization - Urethral (mL): 700 mL  Total OUT: 700 mL    Total NET: -440 mL        Daily     Daily

## 2021-07-17 NOTE — PROGRESS NOTE ADULT - ASSESSMENT
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, dental implants, R fibula MVFF, R thigh STSG 7/14    #Neuro  - pain control per ICU     #ENT  - RN flap checks q2h  - s/p  RI POD0, ASA 81 via NGT daily  - SQH   - Monitor IRVING drain outputs       #CV  - can dc madalyn per SICU  - Monitor BP, maintain MAPs   - Avoid pressors  - 500 cc bolus NS PRN for MAP maintenance     #Resp  - O2 support as indicated    #GI  - continue tube feeds  - advance to goal per nutrition recs   - Bowel regimen while receiving opiates for pain, has not had BM since OR  - PPI, anti-emetics   - Monitor lytes; replace PRN    #ID  - Monitor temp, WBC  - Vanco/flagyl/kefzol    #MSK  - Monitor leg incision   - IRVING to self suction, wound vac to suction  - toe touch ambulation

## 2021-07-17 NOTE — PROGRESS NOTE ADULT - NUTRITIONAL ASSESSMENT
Diet, NPO with Tube Feed:   Tube Feeding Modality: Nasogastric  Jevity 1.2 Yairel (JEVITY1.2RTH)  Total Volume for 24 Hours (mL): 1680  Total Number of Cans: 7  Continuous  Starting Tube Feed Rate {mL per Hour}: 20  Increase Tube Feed Rate by (mL): 10     Every 4 hours  Until Goal Tube Feed Rate (mL per Hour): 70  Tube Feed Duration (in Hours): 24  Tube Feed Start Time: 20:00  Bolus   Total Volume per Flush (mL): 250   Frequency: Every 6 Hours  Liquid Protein Supplement     Qty per Day:  1 (07-17-21 @ 07:53) [Active]

## 2021-07-18 DIAGNOSIS — R33.9 RETENTION OF URINE, UNSPECIFIED: ICD-10-CM

## 2021-07-18 LAB
ANION GAP SERPL CALC-SCNC: 8 MMOL/L — SIGNIFICANT CHANGE UP (ref 5–17)
APPEARANCE UR: CLEAR — SIGNIFICANT CHANGE UP
BILIRUB UR-MCNC: NEGATIVE — SIGNIFICANT CHANGE UP
BUN SERPL-MCNC: 33 MG/DL — HIGH (ref 7–23)
CALCIUM SERPL-MCNC: 7.8 MG/DL — LOW (ref 8.4–10.5)
CHLORIDE SERPL-SCNC: 110 MMOL/L — HIGH (ref 96–108)
CO2 SERPL-SCNC: 24 MMOL/L — SIGNIFICANT CHANGE UP (ref 22–31)
COLOR SPEC: YELLOW — SIGNIFICANT CHANGE UP
CREAT SERPL-MCNC: 1.16 MG/DL — SIGNIFICANT CHANGE UP (ref 0.5–1.3)
DIFF PNL FLD: NEGATIVE — SIGNIFICANT CHANGE UP
GLUCOSE BLDC GLUCOMTR-MCNC: 158 MG/DL — HIGH (ref 70–99)
GLUCOSE BLDC GLUCOMTR-MCNC: 163 MG/DL — HIGH (ref 70–99)
GLUCOSE BLDC GLUCOMTR-MCNC: 163 MG/DL — HIGH (ref 70–99)
GLUCOSE BLDC GLUCOMTR-MCNC: 166 MG/DL — HIGH (ref 70–99)
GLUCOSE BLDC GLUCOMTR-MCNC: 204 MG/DL — HIGH (ref 70–99)
GLUCOSE SERPL-MCNC: 122 MG/DL — HIGH (ref 70–99)
GLUCOSE UR QL: NEGATIVE — SIGNIFICANT CHANGE UP
HCT VFR BLD CALC: 24.9 % — LOW (ref 39–50)
HGB BLD-MCNC: 7.9 G/DL — LOW (ref 13–17)
KETONES UR-MCNC: NEGATIVE — SIGNIFICANT CHANGE UP
LEUKOCYTE ESTERASE UR-ACNC: NEGATIVE — SIGNIFICANT CHANGE UP
MAGNESIUM SERPL-MCNC: 2.5 MG/DL — SIGNIFICANT CHANGE UP (ref 1.6–2.6)
MCHC RBC-ENTMCNC: 27.5 PG — SIGNIFICANT CHANGE UP (ref 27–34)
MCHC RBC-ENTMCNC: 31.7 GM/DL — LOW (ref 32–36)
MCV RBC AUTO: 86.8 FL — SIGNIFICANT CHANGE UP (ref 80–100)
NITRITE UR-MCNC: NEGATIVE — SIGNIFICANT CHANGE UP
NRBC # BLD: 0 /100 WBCS — SIGNIFICANT CHANGE UP (ref 0–0)
PH UR: 6.5 — SIGNIFICANT CHANGE UP (ref 5–8)
PHOSPHATE SERPL-MCNC: 3.3 MG/DL — SIGNIFICANT CHANGE UP (ref 2.5–4.5)
PLATELET # BLD AUTO: 154 K/UL — SIGNIFICANT CHANGE UP (ref 150–400)
POTASSIUM SERPL-MCNC: 4.1 MMOL/L — SIGNIFICANT CHANGE UP (ref 3.5–5.3)
POTASSIUM SERPL-SCNC: 4.1 MMOL/L — SIGNIFICANT CHANGE UP (ref 3.5–5.3)
PROT UR-MCNC: NEGATIVE MG/DL — SIGNIFICANT CHANGE UP
RBC # BLD: 2.87 M/UL — LOW (ref 4.2–5.8)
RBC # FLD: 14.4 % — SIGNIFICANT CHANGE UP (ref 10.3–14.5)
SODIUM SERPL-SCNC: 142 MMOL/L — SIGNIFICANT CHANGE UP (ref 135–145)
SP GR SPEC: 1.02 — SIGNIFICANT CHANGE UP (ref 1–1.03)
UROBILINOGEN FLD QL: 0.2 E.U./DL — SIGNIFICANT CHANGE UP
WBC # BLD: 10.15 K/UL — SIGNIFICANT CHANGE UP (ref 3.8–10.5)
WBC # FLD AUTO: 10.15 K/UL — SIGNIFICANT CHANGE UP (ref 3.8–10.5)

## 2021-07-18 PROCEDURE — 51702 INSERT TEMP BLADDER CATH: CPT

## 2021-07-18 PROCEDURE — 99233 SBSQ HOSP IP/OBS HIGH 50: CPT | Mod: GC

## 2021-07-18 RX ORDER — ZALEPLON 10 MG
5 CAPSULE ORAL ONCE
Refills: 0 | Status: DISCONTINUED | OUTPATIENT
Start: 2021-07-18 | End: 2021-07-18

## 2021-07-18 RX ORDER — HYDRALAZINE HCL 50 MG
5 TABLET ORAL EVERY 4 HOURS
Refills: 0 | Status: DISCONTINUED | OUTPATIENT
Start: 2021-07-18 | End: 2021-07-18

## 2021-07-18 RX ORDER — DIPHENHYDRAMINE HCL 50 MG
25 CAPSULE ORAL EVERY 6 HOURS
Refills: 0 | Status: DISCONTINUED | OUTPATIENT
Start: 2021-07-18 | End: 2021-07-18

## 2021-07-18 RX ORDER — OXYCODONE HYDROCHLORIDE 5 MG/1
5 TABLET ORAL EVERY 4 HOURS
Refills: 0 | Status: DISCONTINUED | OUTPATIENT
Start: 2021-07-18 | End: 2021-07-23

## 2021-07-18 RX ORDER — OXYCODONE HYDROCHLORIDE 5 MG/1
10 TABLET ORAL EVERY 4 HOURS
Refills: 0 | Status: DISCONTINUED | OUTPATIENT
Start: 2021-07-18 | End: 2021-07-23

## 2021-07-18 RX ORDER — HYDRALAZINE HCL 50 MG
5 TABLET ORAL ONCE
Refills: 0 | Status: COMPLETED | OUTPATIENT
Start: 2021-07-18 | End: 2021-07-18

## 2021-07-18 RX ORDER — OXYCODONE HYDROCHLORIDE 5 MG/1
10 TABLET ORAL EVERY 4 HOURS
Refills: 0 | Status: DISCONTINUED | OUTPATIENT
Start: 2021-07-18 | End: 2021-07-18

## 2021-07-18 RX ORDER — HYDRALAZINE HCL 50 MG
5 TABLET ORAL EVERY 4 HOURS
Refills: 0 | Status: DISCONTINUED | OUTPATIENT
Start: 2021-07-18 | End: 2021-07-23

## 2021-07-18 RX ORDER — AMLODIPINE BESYLATE 2.5 MG/1
5 TABLET ORAL DAILY
Refills: 0 | Status: DISCONTINUED | OUTPATIENT
Start: 2021-07-19 | End: 2021-07-23

## 2021-07-18 RX ORDER — ZALEPLON 10 MG
5 CAPSULE ORAL AT BEDTIME
Refills: 0 | Status: DISCONTINUED | OUTPATIENT
Start: 2021-07-18 | End: 2021-07-19

## 2021-07-18 RX ORDER — OXYCODONE HYDROCHLORIDE 5 MG/1
5 TABLET ORAL EVERY 4 HOURS
Refills: 0 | Status: DISCONTINUED | OUTPATIENT
Start: 2021-07-18 | End: 2021-07-18

## 2021-07-18 RX ORDER — ZALEPLON 10 MG
5 CAPSULE ORAL AT BEDTIME
Refills: 0 | Status: DISCONTINUED | OUTPATIENT
Start: 2021-07-18 | End: 2021-07-18

## 2021-07-18 RX ADMIN — Medication 5 MILLIGRAM(S): at 19:35

## 2021-07-18 RX ADMIN — CARVEDILOL PHOSPHATE 6.25 MILLIGRAM(S): 80 CAPSULE, EXTENDED RELEASE ORAL at 22:16

## 2021-07-18 RX ADMIN — Medication 1000 MILLIGRAM(S): at 00:10

## 2021-07-18 RX ADMIN — OXYCODONE HYDROCHLORIDE 10 MILLIGRAM(S): 5 TABLET ORAL at 22:38

## 2021-07-18 RX ADMIN — Medication 1 APPLICATION(S): at 18:48

## 2021-07-18 RX ADMIN — HYDROMORPHONE HYDROCHLORIDE 0.5 MILLIGRAM(S): 2 INJECTION INTRAMUSCULAR; INTRAVENOUS; SUBCUTANEOUS at 04:44

## 2021-07-18 RX ADMIN — Medication 2000 MILLIGRAM(S): at 14:30

## 2021-07-18 RX ADMIN — SENNA PLUS 10 MILLILITER(S): 8.6 TABLET ORAL at 23:03

## 2021-07-18 RX ADMIN — AMLODIPINE BESYLATE 2.5 MILLIGRAM(S): 2.5 TABLET ORAL at 05:18

## 2021-07-18 RX ADMIN — Medication 100 MILLIGRAM(S): at 22:17

## 2021-07-18 RX ADMIN — TAMSULOSIN HYDROCHLORIDE 0.8 MILLIGRAM(S): 0.4 CAPSULE ORAL at 22:19

## 2021-07-18 RX ADMIN — SERTRALINE 100 MILLIGRAM(S): 25 TABLET, FILM COATED ORAL at 12:17

## 2021-07-18 RX ADMIN — CHLORHEXIDINE GLUCONATE 15 MILLILITER(S): 213 SOLUTION TOPICAL at 14:30

## 2021-07-18 RX ADMIN — CHLORHEXIDINE GLUCONATE 15 MILLILITER(S): 213 SOLUTION TOPICAL at 05:17

## 2021-07-18 RX ADMIN — Medication 1000 MILLIGRAM(S): at 13:55

## 2021-07-18 RX ADMIN — Medication 2: at 11:18

## 2021-07-18 RX ADMIN — Medication 1 APPLICATION(S): at 22:17

## 2021-07-18 RX ADMIN — OXYMETAZOLINE HYDROCHLORIDE 2 SPRAY(S): 0.5 SPRAY NASAL at 05:18

## 2021-07-18 RX ADMIN — Medication 1000 MILLIGRAM(S): at 23:47

## 2021-07-18 RX ADMIN — Medication 1000 MILLIGRAM(S): at 06:18

## 2021-07-18 RX ADMIN — HYDROMORPHONE HYDROCHLORIDE 1 MILLIGRAM(S): 2 INJECTION INTRAMUSCULAR; INTRAVENOUS; SUBCUTANEOUS at 00:30

## 2021-07-18 RX ADMIN — Medication 81 MILLIGRAM(S): at 12:10

## 2021-07-18 RX ADMIN — Medication 1 APPLICATION(S): at 10:51

## 2021-07-18 RX ADMIN — HEPARIN SODIUM 5000 UNIT(S): 5000 INJECTION INTRAVENOUS; SUBCUTANEOUS at 14:30

## 2021-07-18 RX ADMIN — Medication 5 MILLIGRAM(S): at 09:40

## 2021-07-18 RX ADMIN — Medication 0.5 MILLIGRAM(S): at 10:51

## 2021-07-18 RX ADMIN — Medication 125 MILLIGRAM(S): at 18:47

## 2021-07-18 RX ADMIN — Medication 1 APPLICATION(S): at 05:18

## 2021-07-18 RX ADMIN — HEPARIN SODIUM 5000 UNIT(S): 5000 INJECTION INTRAVENOUS; SUBCUTANEOUS at 22:17

## 2021-07-18 RX ADMIN — CHLORHEXIDINE GLUCONATE 15 MILLILITER(S): 213 SOLUTION TOPICAL at 22:18

## 2021-07-18 RX ADMIN — CHLORHEXIDINE GLUCONATE 1 APPLICATION(S): 213 SOLUTION TOPICAL at 05:19

## 2021-07-18 RX ADMIN — Medication 1000 MILLIGRAM(S): at 17:50

## 2021-07-18 RX ADMIN — Medication 2: at 17:24

## 2021-07-18 RX ADMIN — Medication 100 MILLIGRAM(S): at 14:30

## 2021-07-18 RX ADMIN — Medication 1 APPLICATION(S): at 00:09

## 2021-07-18 RX ADMIN — Medication 2000 MILLIGRAM(S): at 05:17

## 2021-07-18 RX ADMIN — PANTOPRAZOLE SODIUM 40 MILLIGRAM(S): 20 TABLET, DELAYED RELEASE ORAL at 12:10

## 2021-07-18 RX ADMIN — Medication 1000 MILLIGRAM(S): at 12:11

## 2021-07-18 RX ADMIN — Medication 4: at 01:17

## 2021-07-18 RX ADMIN — Medication 1000 MILLIGRAM(S): at 00:29

## 2021-07-18 RX ADMIN — Medication 100 MILLIGRAM(S): at 05:17

## 2021-07-18 RX ADMIN — Medication 2000 MILLIGRAM(S): at 22:18

## 2021-07-18 RX ADMIN — OXYMETAZOLINE HYDROCHLORIDE 2 SPRAY(S): 0.5 SPRAY NASAL at 22:20

## 2021-07-18 RX ADMIN — POLYETHYLENE GLYCOL 3350 17 GRAM(S): 17 POWDER, FOR SOLUTION ORAL at 12:11

## 2021-07-18 RX ADMIN — Medication 125 MILLIGRAM(S): at 05:18

## 2021-07-18 RX ADMIN — Medication 1000 MILLIGRAM(S): at 19:00

## 2021-07-18 RX ADMIN — Medication 1000 MILLIGRAM(S): at 05:17

## 2021-07-18 RX ADMIN — HEPARIN SODIUM 5000 UNIT(S): 5000 INJECTION INTRAVENOUS; SUBCUTANEOUS at 05:18

## 2021-07-18 NOTE — PROGRESS NOTE ADULT - SUBJECTIVE AND OBJECTIVE BOX
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 Pt now s/p R maxillectomy, dental implants, R fibula MVFF, R thigh STSG 7/14.    07-15 Patient seen at bedside and discussed with attending. flap checks with strong doppler signal. nMAPs 70s-80s. no events overnight.   07-16 Patient seen at bedside and discussed with attending. no acute events overnight. flap checks with strong doppler. failed TOV, getting straight cath. tolerating diet.   07-17 Patient seen at bedside and discussed with attending. flap check strong doppler signal. MAPs at goal. continues to get straight cath 2/2 unable to void.   07-18 Patient seen at bedside and discussed with attending. flap check strong doppler signal. in poor spirits this AM. continues to get straight cathed. complaining of back pain this AM. BM x1 s/p enema.     ALLERGIES  sulfa drugs (Unknown)    MEDICATIONS  (STANDING):  acetaminophen    Suspension .. 1000 milliGRAM(s) Oral every 6 hours  aspirin  chewable 81 milliGRAM(s) Oral daily  BACItracin   Ointment 1 Application(s) Topical every 12 hours  carvedilol 6.25 milliGRAM(s) Oral every 12 hours  ceFAZolin  Injectable. 2000 milliGRAM(s) IV Push every 8 hours  chlorhexidine 0.12% Liquid 15 milliLiter(s) Oral Mucosa <User Schedule>  chlorhexidine 2% Cloths 1 Application(s) Topical daily  dextrose 40% Gel 15 Gram(s) Oral once  dextrose 5%. 1000 milliLiter(s) (50 mL/Hr) IV Continuous <Continuous>  dextrose 5%. 1000 milliLiter(s) (100 mL/Hr) IV Continuous <Continuous>  dextrose 50% Injectable 25 Gram(s) IV Push once  dextrose 50% Injectable 12.5 Gram(s) IV Push once  dextrose 50% Injectable 25 Gram(s) IV Push once  glucagon  Injectable 1 milliGRAM(s) IntraMuscular once  heparin   Injectable 5000 Unit(s) SubCutaneous every 8 hours  insulin lispro (ADMELOG) corrective regimen sliding scale   SubCutaneous every 6 hours  melatonin liquid 1mg/1ml 3 milliGRAM(s) 3 milliGRAM(s) Oral at bedtime  metroNIDAZOLE  IVPB 500 milliGRAM(s) IV Intermittent every 8 hours  oxymetazoline 0.05% Nasal Spray 2 Spray(s) Both Nostrils every 12 hours  pantoprazole  Injectable 40 milliGRAM(s) IV Push daily  petrolatum white Ointment 1 Application(s) Topical every 12 hours  polyethylene glycol 3350 17 Gram(s) Oral daily  senna Syrup 10 milliLiter(s) Oral at bedtime  sertraline 100 milliGRAM(s) Oral daily  tamsulosin 0.8 milliGRAM(s) Oral at bedtime  vancomycin    Solution 125 milliGRAM(s) Oral every 12 hours    MEDICATIONS  (PRN):  bisacodyl Suppository 10 milliGRAM(s) Rectal daily PRN Constipation  HYDROmorphone  Injectable 0.5 milliGRAM(s) IV Push every 4 hours PRN Moderate Pain (4 - 6)  HYDROmorphone  Injectable 1 milliGRAM(s) IV Push every 4 hours PRN Severe Pain (7 - 10)  LORazepam   Injectable 0.5 milliGRAM(s) IV Push every 8 hours PRN Anxiety        LABS                         7.9    10.15 )-----------( 154      ( 18 Jul 2021 05:19 )             24.9    07-18    142  |  110<H>  |  33<H>  ----------------------------<  122<H>  4.1   |  24  |  1.16    Ca    7.8<L>      18 Jul 2021 05:19  Phos  3.3     07-18  Mg     2.5     07-18                INs & OUTs  Drains:     07-17 @ 07:01  -  07-18 @ 07:00  --------------------------------------------------------  OUT:    Bulb (mL): 25 mL    Bulb (mL): 13 mL    VAC (Vacuum Assisted Closure) System (mL): 0 mL  Total OUT: 38 mL        VITAL SIGNS:  ICU Vital Signs Last 24 Hrs  T(C): 36.6 (18 Jul 2021 05:10), Max: 37.6 (17 Jul 2021 21:45)  T(F): 97.9 (18 Jul 2021 05:10), Max: 99.6 (17 Jul 2021 21:45)  HR: 62 (18 Jul 2021 11:00) (54 - 85)  BP: 157/80 (18 Jul 2021 11:00) (156/80 - 157/80)  BP(mean): 110 (18 Jul 2021 11:00) (110 - 112)  ABP: 179/70 (18 Jul 2021 11:00) (129/49 - 198/77)  ABP(mean): 105 (18 Jul 2021 11:00) (71 - 118)  RR: 22 (18 Jul 2021 11:00) (16 - 22)  SpO2: 96% (18 Jul 2021 11:00) (91% - 96%)            Physical Exam: NAD  R nasal cavity with nasal trumpet and NGT sutured to septum  Intraoral skin paddle warm, well-perfused, incisions c/d/i with triphasic doppler signal  Neck incisions c/d/i, IRVING with SS fluid   Breathing comfortably on RA  R thigh STSG donor site dressed with tegederm  R fibula donor site wrapped with cast/ACE, IRVING x1 with blood, wound vac holding suction

## 2021-07-18 NOTE — PROGRESS NOTE ADULT - ASSESSMENT
70 y/o M with past medical history significant for depression, HLD, HTN, Prostate ca with past surgical history significant for ethmoidectomy and hernia repair. 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 Transferred to SICU post-op extubated on NC for close hemodynamic monitoring and flap checks. Has continued to make great progress, following ENT flap pathway. Still requiring q6h bladder scan for urinary retention.     Neuro: Zoloft, Dilaudid PRN fl + Tylenol ATC. Ativan PRN, Psych consult  HEENT: Flap checks q4, ASA 81mg, s/p Decadron 10 q8 x3 doses post op; Afrin BID, Bacitracin to nares, vasline to lips BID, humidified face tent  CV: Goal MAP>65; HD stable, Avoid pressors, Restarted home Coreg 6.25 BID, Norvasc 2.5 qd; .   Pulm: Satting well on RA  GI: NPO, DHT/TF Jevity 1.2@70ml/hr, free water 250ml q6h, PPI; senna, miralax, prn suppository, enema  : failed TOV, q6 straight cath for bladder scan>300cc, Hx of BPH, Flomax via NGT, Urology recommended Mireles placement        ID: hx of Cdiff:  PO Vanco ( 7/14-). Post op PPX: Ancef( 7/14-)  Flagyl (7/14-)   Endo: ISS, for hypoglycemia: glucagon injectable PRN, dextrose 40% gel PRN  ppx: SCD, SQH  Lines: Xochilt Echols(7/14-)   Wounds: Wound vac in Rt Calf. IRVING x 1 in rt calf. IRVING x1 in neck.   PT/OT: Ordered  Dispo: Tele     70 y/o M with past medical history significant for depression, HLD, HTN, Prostate ca with past surgical history significant for ethmoidectomy and hernia repair. 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 Transferred to SICU post-op extubated on NC for close hemodynamic monitoring and flap checks. Has continued to make great progress, following ENT flap pathway. Still requiring q6h bladder scan for urinary retention.     Neuro: Zoloft, Dilaudid PRN fl + Tylenol ATC. Ativan PRN, Psych consult  HEENT: Flap checks q4, ASA 81mg, s/p Decadron 10 q8 x3 doses post op; Afrin BID, Bacitracin to nares, vasline to lips BID, humidified face tent  CV: Goal MAP>65; HD stable, Avoid pressors, Restarted home Coreg 6.25 BID, Norvasc 2.5 qd; .   Pulm: Satting well on RA  GI: NPO, DHT/TF Jevity 1.2@70ml/hr, free water 250ml q6h, PPI; senna, miralax, prn suppository, enema  : failed TOV, q6 straight cath for bladder scan>300cc, Hx of BPH, Flomax via NGT, Urology recommended Mireles placement        ID: hx of Cdiff:  PO Vanco ( 7/14-). Post op PPX: Ancef( 7/14-)  Flagyl (7/14-)   Endo: ISS, for hypoglycemia: glucagon injectable PRN, dextrose 40% gel PRN when blood sugar <70  ppx: SCD, SQH  Lines: Xochilt Echols(7/14-)   Wounds: Wound vac in Rt Calf. IRVING x 1 in rt calf. IRVING x1 in neck.   PT/OT: Ordered  Dispo: Tele

## 2021-07-18 NOTE — PROGRESS NOTE ADULT - ASSESSMENT
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, dental implants, R fibula MVFF, R thigh STSG 7/14    #Neuro  - pain control per ICU     #ENT  - RN flap checks advance to q4h  - transfer to stepdown  - s/p  CA POD0, ASA 81 via NGT daily  - SQH   - Monitor IRVING drain outputs   - f/u uro consult  - f/u psych consult  - humidified facetent  - ativan PRN    #CV  - d/c madalyn prior to stepdwon  - Monitor BP, maintain MAPs   - Avoid pressors  - 500 cc bolus NS PRN for MAP maintenance     #Resp  - O2 support as indicated    #GI  - continue tube feeds  - advance to goal per nutrition recs   - Bowel regimen while receiving opiates for pain, BM this AM s/p enema  - PPI, anti-emetics   - Monitor lytes; replace PRN    #ID  - Monitor temp, WBC  - Vanco/flagyl/kefzol    #MSK  - Monitor leg incision   - IRVING to self suction, wound vac to suction  - toe touch ambulation

## 2021-07-18 NOTE — CONSULT NOTE ADULT - PROBLEM SELECTOR RECOMMENDATION 9
-place medel  -bowel regimen  -avoid narcotics  -possible TOV prior to discharge if more OOB and +BM's  -continue flomax  -can f/u with his own urologist as abebe or Dr Akins  -will follow

## 2021-07-18 NOTE — PROGRESS NOTE ADULT - SUBJECTIVE AND OBJECTIVE BOX
INTERVAL HPI/OVERNIGHT EVENTS:      MEDICATIONS  (STANDING):  acetaminophen    Suspension .. 1000 milliGRAM(s) Oral every 6 hours  aspirin  chewable 81 milliGRAM(s) Oral daily  BACItracin   Ointment 1 Application(s) Topical every 12 hours  carvedilol 6.25 milliGRAM(s) Oral every 12 hours  ceFAZolin  Injectable. 2000 milliGRAM(s) IV Push every 8 hours  chlorhexidine 0.12% Liquid 15 milliLiter(s) Oral Mucosa <User Schedule>  chlorhexidine 2% Cloths 1 Application(s) Topical daily  dextrose 40% Gel 15 Gram(s) Oral once  dextrose 5%. 1000 milliLiter(s) (50 mL/Hr) IV Continuous <Continuous>  dextrose 5%. 1000 milliLiter(s) (100 mL/Hr) IV Continuous <Continuous>  dextrose 50% Injectable 25 Gram(s) IV Push once  dextrose 50% Injectable 12.5 Gram(s) IV Push once  dextrose 50% Injectable 25 Gram(s) IV Push once  glucagon  Injectable 1 milliGRAM(s) IntraMuscular once  heparin   Injectable 5000 Unit(s) SubCutaneous every 8 hours  hydrALAZINE Injectable 5 milliGRAM(s) IV Push once  insulin lispro (ADMELOG) corrective regimen sliding scale   SubCutaneous every 6 hours  melatonin liquid 1mg/1ml 3 milliGRAM(s) 3 milliGRAM(s) Oral at bedtime  metroNIDAZOLE  IVPB 500 milliGRAM(s) IV Intermittent every 8 hours  oxymetazoline 0.05% Nasal Spray 2 Spray(s) Both Nostrils every 12 hours  pantoprazole  Injectable 40 milliGRAM(s) IV Push daily  petrolatum white Ointment 1 Application(s) Topical every 12 hours  polyethylene glycol 3350 17 Gram(s) Oral daily  senna Syrup 10 milliLiter(s) Oral at bedtime  sertraline 100 milliGRAM(s) Oral daily  tamsulosin 0.8 milliGRAM(s) Oral at bedtime  vancomycin    Solution 125 milliGRAM(s) Oral every 12 hours    MEDICATIONS  (PRN):  bisacodyl Suppository 10 milliGRAM(s) Rectal daily PRN Constipation  HYDROmorphone  Injectable 0.5 milliGRAM(s) IV Push every 4 hours PRN Moderate Pain (4 - 6)  HYDROmorphone  Injectable 1 milliGRAM(s) IV Push every 4 hours PRN Severe Pain (7 - 10)      Drug Dosing Weight  Height (cm): 175.3 (14 Jul 2021 06:24)  Weight (kg): 90.6 (14 Jul 2021 06:24)  BMI (kg/m2): 29.5 (14 Jul 2021 06:24)  BSA (m2): 2.06 (14 Jul 2021 06:24)    PAST MEDICAL & SURGICAL HISTORY:  Cancer of prostate    History of hormone therapy  testosterone blockers  May 2021    Depression, unspecified depression type    Hypertension, unspecified type    Hyperlipidemia, unspecified hyperlipidemia type    H/O gastroesophageal reflux (GERD)    Benign neoplasm    Clostridium difficile diarrhea  JAn 2021    Inguinal hernia, right    History of umbilical hernia repair    History of endoscopic sinus surgery        ICU Vital Signs Last 24 Hrs  T(C): 36.6 (18 Jul 2021 05:10), Max: 37.6 (17 Jul 2021 21:45)  T(F): 97.9 (18 Jul 2021 05:10), Max: 99.6 (17 Jul 2021 21:45)  HR: 57 (18 Jul 2021 09:00) (54 - 72)  BP: --  BP(mean): --  ABP: 172/66 (18 Jul 2021 09:00) (125/53 - 177/71)  ABP(mean): 103 (18 Jul 2021 09:00) (71 - 108)  RR: 18 (18 Jul 2021 09:00) (16 - 22)  SpO2: 94% (18 Jul 2021 09:00) (91% - 95%)            17 Jul 2021 07:01  -  18 Jul 2021 07:00  --------------------------------------------------------  IN:    Enteral Tube Flush: 150 mL    IV PiggyBack: 100 mL    Jevity 1.2: 1680 mL  Total IN: 1930 mL    OUT:    Bulb (mL): 25 mL    Bulb (mL): 13 mL    Intermittent Catheterization - Urethral (mL): 2400 mL    VAC (Vacuum Assisted Closure) System (mL): 0 mL  Total OUT: 2438 mL    Total NET: -508 mL              PHYSICAL EXAM:      Constitutional:    Eyes:    ENMT:    Neck:    Breasts:    Back:    Respiratory:    Cardiovascular:    Gastrointestinal:    Genitourinary:    Rectal:    Extremities:    Vascular:    Neurological:    Skin:    Lymph Nodes:    Musculoskeletal:    Psychiatric:        LABS:  CBC Full  -  ( 18 Jul 2021 05:19 )  WBC Count : 10.15 K/uL  RBC Count : 2.87 M/uL  Hemoglobin : 7.9 g/dL  Hematocrit : 24.9 %  Platelet Count - Automated : 154 K/uL  Mean Cell Volume : 86.8 fl  Mean Cell Hemoglobin : 27.5 pg  Mean Cell Hemoglobin Concentration : 31.7 gm/dL  Auto Neutrophil # : x  Auto Lymphocyte # : x  Auto Monocyte # : x  Auto Eosinophil # : x  Auto Basophil # : x  Auto Neutrophil % : x  Auto Lymphocyte % : x  Auto Monocyte % : x  Auto Eosinophil % : x  Auto Basophil % : x    07-18    142  |  110<H>  |  33<H>  ----------------------------<  122<H>  4.1   |  24  |  1.16    Ca    7.8<L>      18 Jul 2021 05:19  Phos  3.3     07-18  Mg     2.5     07-18            RADIOLOGY & ADDITIONAL STUDIES:   INTERVAL HPI/OVERNIGHT EVENTS: Q2 flap checks were good. has not passed bowel movements for the past 2 days. Free water added to his tube feed yesterday for hypernatremia which seem to help.       MEDICATIONS  (STANDING):  acetaminophen    Suspension .. 1000 milliGRAM(s) Oral every 6 hours  aspirin  chewable 81 milliGRAM(s) Oral daily  BACItracin   Ointment 1 Application(s) Topical every 12 hours  carvedilol 6.25 milliGRAM(s) Oral every 12 hours  ceFAZolin  Injectable. 2000 milliGRAM(s) IV Push every 8 hours  chlorhexidine 0.12% Liquid 15 milliLiter(s) Oral Mucosa <User Schedule>  chlorhexidine 2% Cloths 1 Application(s) Topical daily  dextrose 40% Gel 15 Gram(s) Oral once  dextrose 5%. 1000 milliLiter(s) (50 mL/Hr) IV Continuous <Continuous>  dextrose 5%. 1000 milliLiter(s) (100 mL/Hr) IV Continuous <Continuous>  dextrose 50% Injectable 25 Gram(s) IV Push once  dextrose 50% Injectable 12.5 Gram(s) IV Push once  dextrose 50% Injectable 25 Gram(s) IV Push once  glucagon  Injectable 1 milliGRAM(s) IntraMuscular once  heparin   Injectable 5000 Unit(s) SubCutaneous every 8 hours  hydrALAZINE Injectable 5 milliGRAM(s) IV Push once  insulin lispro (ADMELOG) corrective regimen sliding scale   SubCutaneous every 6 hours  melatonin liquid 1mg/1ml 3 milliGRAM(s) 3 milliGRAM(s) Oral at bedtime  metroNIDAZOLE  IVPB 500 milliGRAM(s) IV Intermittent every 8 hours  oxymetazoline 0.05% Nasal Spray 2 Spray(s) Both Nostrils every 12 hours  pantoprazole  Injectable 40 milliGRAM(s) IV Push daily  petrolatum white Ointment 1 Application(s) Topical every 12 hours  polyethylene glycol 3350 17 Gram(s) Oral daily  senna Syrup 10 milliLiter(s) Oral at bedtime  sertraline 100 milliGRAM(s) Oral daily  tamsulosin 0.8 milliGRAM(s) Oral at bedtime  vancomycin    Solution 125 milliGRAM(s) Oral every 12 hours    MEDICATIONS  (PRN):  bisacodyl Suppository 10 milliGRAM(s) Rectal daily PRN Constipation  HYDROmorphone  Injectable 0.5 milliGRAM(s) IV Push every 4 hours PRN Moderate Pain (4 - 6)  HYDROmorphone  Injectable 1 milliGRAM(s) IV Push every 4 hours PRN Severe Pain (7 - 10)      Drug Dosing Weight  Height (cm): 175.3 (14 Jul 2021 06:24)  Weight (kg): 90.6 (14 Jul 2021 06:24)  BMI (kg/m2): 29.5 (14 Jul 2021 06:24)  BSA (m2): 2.06 (14 Jul 2021 06:24)    PAST MEDICAL & SURGICAL HISTORY:  Cancer of prostate    History of hormone therapy  testosterone blockers  May 2021    Depression, unspecified depression type    Hypertension, unspecified type    Hyperlipidemia, unspecified hyperlipidemia type    H/O gastroesophageal reflux (GERD)    Benign neoplasm    Clostridium difficile diarrhea  JAn 2021    Inguinal hernia, right    History of umbilical hernia repair    History of endoscopic sinus surgery        ICU Vital Signs Last 24 Hrs  T(C): 36.6 (18 Jul 2021 05:10), Max: 37.6 (17 Jul 2021 21:45)  T(F): 97.9 (18 Jul 2021 05:10), Max: 99.6 (17 Jul 2021 21:45)  HR: 57 (18 Jul 2021 09:00) (54 - 72)  BP: --  BP(mean): --  ABP: 172/66 (18 Jul 2021 09:00) (125/53 - 177/71)  ABP(mean): 103 (18 Jul 2021 09:00) (71 - 108)  RR: 18 (18 Jul 2021 09:00) (16 - 22)  SpO2: 94% (18 Jul 2021 09:00) (91% - 95%)            17 Jul 2021 07:01  -  18 Jul 2021 07:00  --------------------------------------------------------  IN:    Enteral Tube Flush: 150 mL    IV PiggyBack: 100 mL    Jevity 1.2: 1680 mL  Total IN: 1930 mL    OUT:    Bulb (mL): 25 mL    Bulb (mL): 13 mL    Intermittent Catheterization - Urethral (mL): 2400 mL    VAC (Vacuum Assisted Closure) System (mL): 0 mL  Total OUT: 2438 mL    Total NET: -508 mL              PHYSICAL EXAM:      Constitutional: not in acute distress; anxious however    Eyes: normal EOM    ENT: Flaps are well vascularized. Suture lines are intact, clean. Flap drain: serosang output    Neck: no swelling or bruise    Respiratory: on RA nonlabored breathing    Cardiovascular: sinus rhythm     Gastrointestinal: soft, nondistended abdomen    Genitourinary: voiding    Rectal: deferred    Extremities: WWP    Neurological: Alert, no focal neurological deficits    Skin: anicteric    Musculoskeletal: no joint swelling or tenderness    Psychiatric: anxious but coherent thought process        LABS:  CBC Full  -  ( 18 Jul 2021 05:19 )  WBC Count : 10.15 K/uL  RBC Count : 2.87 M/uL  Hemoglobin : 7.9 g/dL  Hematocrit : 24.9 %  Platelet Count - Automated : 154 K/uL  Mean Cell Volume : 86.8 fl  Mean Cell Hemoglobin : 27.5 pg  Mean Cell Hemoglobin Concentration : 31.7 gm/dL  Auto Neutrophil # : x  Auto Lymphocyte # : x  Auto Monocyte # : x  Auto Eosinophil # : x  Auto Basophil # : x  Auto Neutrophil % : x  Auto Lymphocyte % : x  Auto Monocyte % : x  Auto Eosinophil % : x  Auto Basophil % : x    07-18    142  |  110<H>  |  33<H>  ----------------------------<  122<H>  4.1   |  24  |  1.16    Ca    7.8<L>      18 Jul 2021 05:19  Phos  3.3     07-18  Mg     2.5     07-18            RADIOLOGY & ADDITIONAL STUDIES:

## 2021-07-18 NOTE — CONSULT NOTE ADULT - SUBJECTIVE AND OBJECTIVE BOX
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 Pt now s/p R maxillectomy, dental implants, R fibula MVFF, R thigh STSG 7/14. (14 Jul 2021 17:23)    : above noted. Called to evaluate. S/P OR 7/14. Patient with high PVR's post op requiring straight catheterizations. H/O CaP- s/p RTx. H/O prior urinary retention after procedure requiring him to go home with medel taken out as outpt. On flomax 0.8mg qhs.       PAST MEDICAL & SURGICAL HISTORY:  Cancer of prostate    History of hormone therapy  testosterone blockers  May 2021    Depression, unspecified depression type    Hypertension, unspecified type    Hyperlipidemia, unspecified hyperlipidemia type    H/O gastroesophageal reflux (GERD)    Benign neoplasm    Clostridium difficile diarrhea  JAn 2021    Inguinal hernia, right    History of umbilical hernia repair    History of endoscopic sinus surgery        MEDICATIONS  (STANDING):  acetaminophen    Suspension .. 1000 milliGRAM(s) Oral every 6 hours  aspirin  chewable 81 milliGRAM(s) Oral daily  BACItracin   Ointment 1 Application(s) Topical every 12 hours  carvedilol 6.25 milliGRAM(s) Oral every 12 hours  ceFAZolin  Injectable. 2000 milliGRAM(s) IV Push every 8 hours  chlorhexidine 0.12% Liquid 15 milliLiter(s) Oral Mucosa <User Schedule>  chlorhexidine 2% Cloths 1 Application(s) Topical daily  dextrose 40% Gel 15 Gram(s) Oral once  dextrose 5%. 1000 milliLiter(s) (50 mL/Hr) IV Continuous <Continuous>  dextrose 5%. 1000 milliLiter(s) (100 mL/Hr) IV Continuous <Continuous>  dextrose 50% Injectable 25 Gram(s) IV Push once  dextrose 50% Injectable 12.5 Gram(s) IV Push once  dextrose 50% Injectable 25 Gram(s) IV Push once  glucagon  Injectable 1 milliGRAM(s) IntraMuscular once  heparin   Injectable 5000 Unit(s) SubCutaneous every 8 hours  insulin lispro (ADMELOG) corrective regimen sliding scale   SubCutaneous every 6 hours  melatonin liquid 1mg/1ml 3 milliGRAM(s) 3 milliGRAM(s) Oral at bedtime  metroNIDAZOLE  IVPB 500 milliGRAM(s) IV Intermittent every 8 hours  oxymetazoline 0.05% Nasal Spray 2 Spray(s) Both Nostrils every 12 hours  pantoprazole  Injectable 40 milliGRAM(s) IV Push daily  petrolatum white Ointment 1 Application(s) Topical every 12 hours  polyethylene glycol 3350 17 Gram(s) Oral daily  senna Syrup 10 milliLiter(s) Oral at bedtime  sertraline 100 milliGRAM(s) Oral daily  tamsulosin 0.8 milliGRAM(s) Oral at bedtime  vancomycin    Solution 125 milliGRAM(s) Oral every 12 hours    MEDICATIONS  (PRN):  bisacodyl Suppository 10 milliGRAM(s) Rectal daily PRN Constipation  HYDROmorphone  Injectable 0.5 milliGRAM(s) IV Push every 4 hours PRN Moderate Pain (4 - 6)  HYDROmorphone  Injectable 1 milliGRAM(s) IV Push every 4 hours PRN Severe Pain (7 - 10)  LORazepam   Injectable 0.5 milliGRAM(s) IV Push every 8 hours PRN Anxiety      Allergies    sulfa drugs (Unknown)    Intolerances        SOCIAL HISTORY:    FAMILY HISTORY:      Vital Signs Last 24 Hrs  T(C): 36.6 (18 Jul 2021 05:10), Max: 37.6 (17 Jul 2021 21:45)  T(F): 97.9 (18 Jul 2021 05:10), Max: 99.6 (17 Jul 2021 21:45)  HR: 57 (18 Jul 2021 09:00) (54 - 72)  BP: --  BP(mean): --  RR: 18 (18 Jul 2021 09:00) (16 - 22)  SpO2: 94% (18 Jul 2021 09:00) (91% - 95%)    On PE:  General: alert and awake  Abdomen:  :  EXT:    LABS:                        7.9    10.15 )-----------( 154      ( 18 Jul 2021 05:19 )             24.9     07-18    142  |  110<H>  |  33<H>  ----------------------------<  122<H>  4.1   |  24  |  1.16    Ca    7.8<L>      18 Jul 2021 05:19  Phos  3.3     07-18  Mg     2.5     07-18            RADIOLOGY & ADDITIONAL STUDIES: 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 Pt now s/p R maxillectomy, dental implants, R fibula MVFF, R thigh STSG 7/14. (14 Jul 2021 17:23)    : above noted. Called to evaluate. S/P OR 7/14. Patient with high PVR's 500-600cc's post op requiring straight catheterizations. H/O CaP diagnosed about 2 years ago- s/p RTx and hormone therapy which ended this past april.  H/O prior urinary retention after procedure requiring him to go home with medel taken out as outpt. On flomax 0.8mg qhs. Patient was straight catheterized this am and has not voided since. Does not recall his urologist's name from [facility] [place].   +urinary frequency and urgency prior to admission. No dysuria/hematuria. +BM today. Not OOB as prior to admission.      PAST MEDICAL & SURGICAL HISTORY:  Cancer of prostate    History of hormone therapy  testosterone blockers  May 2021    Depression, unspecified depression type    Hypertension, unspecified type    Hyperlipidemia, unspecified hyperlipidemia type    H/O gastroesophageal reflux (GERD)    Benign neoplasm    Clostridium difficile diarrhea  JAn 2021    Inguinal hernia, right    History of umbilical hernia repair    History of endoscopic sinus surgery        MEDICATIONS  (STANDING):  acetaminophen    Suspension .. 1000 milliGRAM(s) Oral every 6 hours  aspirin  chewable 81 milliGRAM(s) Oral daily  BACItracin   Ointment 1 Application(s) Topical every 12 hours  carvedilol 6.25 milliGRAM(s) Oral every 12 hours  ceFAZolin  Injectable. 2000 milliGRAM(s) IV Push every 8 hours  chlorhexidine 0.12% Liquid 15 milliLiter(s) Oral Mucosa <User Schedule>  chlorhexidine 2% Cloths 1 Application(s) Topical daily  dextrose 40% Gel 15 Gram(s) Oral once  dextrose 5%. 1000 milliLiter(s) (50 mL/Hr) IV Continuous <Continuous>  dextrose 5%. 1000 milliLiter(s) (100 mL/Hr) IV Continuous <Continuous>  dextrose 50% Injectable 25 Gram(s) IV Push once  dextrose 50% Injectable 12.5 Gram(s) IV Push once  dextrose 50% Injectable 25 Gram(s) IV Push once  glucagon  Injectable 1 milliGRAM(s) IntraMuscular once  heparin   Injectable 5000 Unit(s) SubCutaneous every 8 hours  insulin lispro (ADMELOG) corrective regimen sliding scale   SubCutaneous every 6 hours  melatonin liquid 1mg/1ml 3 milliGRAM(s) 3 milliGRAM(s) Oral at bedtime  metroNIDAZOLE  IVPB 500 milliGRAM(s) IV Intermittent every 8 hours  oxymetazoline 0.05% Nasal Spray 2 Spray(s) Both Nostrils every 12 hours  pantoprazole  Injectable 40 milliGRAM(s) IV Push daily  petrolatum white Ointment 1 Application(s) Topical every 12 hours  polyethylene glycol 3350 17 Gram(s) Oral daily  senna Syrup 10 milliLiter(s) Oral at bedtime  sertraline 100 milliGRAM(s) Oral daily  tamsulosin 0.8 milliGRAM(s) Oral at bedtime  vancomycin    Solution 125 milliGRAM(s) Oral every 12 hours    MEDICATIONS  (PRN):  bisacodyl Suppository 10 milliGRAM(s) Rectal daily PRN Constipation  HYDROmorphone  Injectable 0.5 milliGRAM(s) IV Push every 4 hours PRN Moderate Pain (4 - 6)  HYDROmorphone  Injectable 1 milliGRAM(s) IV Push every 4 hours PRN Severe Pain (7 - 10)  LORazepam   Injectable 0.5 milliGRAM(s) IV Push every 8 hours PRN Anxiety      Allergies    sulfa drugs (Unknown)    Intolerances        SOCIAL HISTORY:    FAMILY HISTORY:      Vital Signs Last 24 Hrs  T(C): 36.6 (18 Jul 2021 05:10), Max: 37.6 (17 Jul 2021 21:45)  T(F): 97.9 (18 Jul 2021 05:10), Max: 99.6 (17 Jul 2021 21:45)  HR: 57 (18 Jul 2021 09:00) (54 - 72)  BP: --  BP(mean): --  RR: 18 (18 Jul 2021 09:00) (16 - 22)  SpO2: 94% (18 Jul 2021 09:00) (91% - 95%)    On PE:  General: alert and awake  Abdomen: soft, NT, ND  : circumcised phallus, bilat testes descended, NT      LABS:                        7.9    10.15 )-----------( 154      ( 18 Jul 2021 05:19 )             24.9     07-18    142  |  110<H>  |  33<H>  ----------------------------<  122<H>  4.1   |  24  |  1.16    Ca    7.8<L>      18 Jul 2021 05:19  Phos  3.3     07-18  Mg     2.5     07-18            RADIOLOGY & ADDITIONAL STUDIES:

## 2021-07-19 DIAGNOSIS — R41.0 DISORIENTATION, UNSPECIFIED: ICD-10-CM

## 2021-07-19 DIAGNOSIS — F43.20 ADJUSTMENT DISORDER, UNSPECIFIED: ICD-10-CM

## 2021-07-19 LAB
ANION GAP SERPL CALC-SCNC: 12 MMOL/L — SIGNIFICANT CHANGE UP (ref 5–17)
BUN SERPL-MCNC: 27 MG/DL — HIGH (ref 7–23)
CALCIUM SERPL-MCNC: 8.2 MG/DL — LOW (ref 8.4–10.5)
CHLORIDE SERPL-SCNC: 110 MMOL/L — HIGH (ref 96–108)
CO2 SERPL-SCNC: 20 MMOL/L — LOW (ref 22–31)
CREAT SERPL-MCNC: 0.97 MG/DL — SIGNIFICANT CHANGE UP (ref 0.5–1.3)
GLUCOSE BLDC GLUCOMTR-MCNC: 102 MG/DL — HIGH (ref 70–99)
GLUCOSE BLDC GLUCOMTR-MCNC: 135 MG/DL — HIGH (ref 70–99)
GLUCOSE BLDC GLUCOMTR-MCNC: 185 MG/DL — HIGH (ref 70–99)
GLUCOSE SERPL-MCNC: 119 MG/DL — HIGH (ref 70–99)
HCT VFR BLD CALC: 30 % — LOW (ref 39–50)
HGB BLD-MCNC: 9.3 G/DL — LOW (ref 13–17)
MAGNESIUM SERPL-MCNC: 2.4 MG/DL — SIGNIFICANT CHANGE UP (ref 1.6–2.6)
MCHC RBC-ENTMCNC: 27.4 PG — SIGNIFICANT CHANGE UP (ref 27–34)
MCHC RBC-ENTMCNC: 31 GM/DL — LOW (ref 32–36)
MCV RBC AUTO: 88.2 FL — SIGNIFICANT CHANGE UP (ref 80–100)
NRBC # BLD: 0 /100 WBCS — SIGNIFICANT CHANGE UP (ref 0–0)
PHOSPHATE SERPL-MCNC: 3.6 MG/DL — SIGNIFICANT CHANGE UP (ref 2.5–4.5)
PLATELET # BLD AUTO: 200 K/UL — SIGNIFICANT CHANGE UP (ref 150–400)
POTASSIUM SERPL-MCNC: 4.5 MMOL/L — SIGNIFICANT CHANGE UP (ref 3.5–5.3)
POTASSIUM SERPL-SCNC: 4.5 MMOL/L — SIGNIFICANT CHANGE UP (ref 3.5–5.3)
RBC # BLD: 3.4 M/UL — LOW (ref 4.2–5.8)
RBC # FLD: 14.3 % — SIGNIFICANT CHANGE UP (ref 10.3–14.5)
SODIUM SERPL-SCNC: 142 MMOL/L — SIGNIFICANT CHANGE UP (ref 135–145)
WBC # BLD: 11.71 K/UL — HIGH (ref 3.8–10.5)
WBC # FLD AUTO: 11.71 K/UL — HIGH (ref 3.8–10.5)

## 2021-07-19 PROCEDURE — 99223 1ST HOSP IP/OBS HIGH 75: CPT

## 2021-07-19 RX ORDER — SIMETHICONE 80 MG/1
80 TABLET, CHEWABLE ORAL EVERY 8 HOURS
Refills: 0 | Status: DISCONTINUED | OUTPATIENT
Start: 2021-07-19 | End: 2021-07-23

## 2021-07-19 RX ORDER — SIMETHICONE 80 MG/1
80 TABLET, CHEWABLE ORAL EVERY 8 HOURS
Refills: 0 | Status: DISCONTINUED | OUTPATIENT
Start: 2021-07-19 | End: 2021-07-19

## 2021-07-19 RX ORDER — QUETIAPINE FUMARATE 200 MG/1
25 TABLET, FILM COATED ORAL EVERY 6 HOURS
Refills: 0 | Status: DISCONTINUED | OUTPATIENT
Start: 2021-07-19 | End: 2021-07-20

## 2021-07-19 RX ORDER — MINERAL OIL
133 OIL (ML) MISCELLANEOUS ONCE
Refills: 0 | Status: COMPLETED | OUTPATIENT
Start: 2021-07-19 | End: 2021-07-19

## 2021-07-19 RX ORDER — MAGNESIUM HYDROXIDE 400 MG/1
30 TABLET, CHEWABLE ORAL DAILY
Refills: 0 | Status: DISCONTINUED | OUTPATIENT
Start: 2021-07-19 | End: 2021-07-20

## 2021-07-19 RX ORDER — LANOLIN ALCOHOL/MO/W.PET/CERES
3 CREAM (GRAM) TOPICAL AT BEDTIME
Refills: 0 | Status: DISCONTINUED | OUTPATIENT
Start: 2021-07-19 | End: 2021-07-23

## 2021-07-19 RX ADMIN — MAGNESIUM HYDROXIDE 30 MILLILITER(S): 400 TABLET, CHEWABLE ORAL at 18:08

## 2021-07-19 RX ADMIN — HEPARIN SODIUM 5000 UNIT(S): 5000 INJECTION INTRAVENOUS; SUBCUTANEOUS at 17:54

## 2021-07-19 RX ADMIN — Medication 1000 MILLIGRAM(S): at 05:27

## 2021-07-19 RX ADMIN — CHLORHEXIDINE GLUCONATE 15 MILLILITER(S): 213 SOLUTION TOPICAL at 17:54

## 2021-07-19 RX ADMIN — HEPARIN SODIUM 5000 UNIT(S): 5000 INJECTION INTRAVENOUS; SUBCUTANEOUS at 21:27

## 2021-07-19 RX ADMIN — Medication 5 MILLIGRAM(S): at 01:10

## 2021-07-19 RX ADMIN — Medication 1000 MILLIGRAM(S): at 06:16

## 2021-07-19 RX ADMIN — OXYCODONE HYDROCHLORIDE 5 MILLIGRAM(S): 5 TABLET ORAL at 21:28

## 2021-07-19 RX ADMIN — Medication 1 APPLICATION(S): at 21:27

## 2021-07-19 RX ADMIN — Medication 5 MILLIGRAM(S): at 22:36

## 2021-07-19 RX ADMIN — CARVEDILOL PHOSPHATE 6.25 MILLIGRAM(S): 80 CAPSULE, EXTENDED RELEASE ORAL at 21:27

## 2021-07-19 RX ADMIN — OXYCODONE HYDROCHLORIDE 5 MILLIGRAM(S): 5 TABLET ORAL at 17:43

## 2021-07-19 RX ADMIN — Medication 1000 MILLIGRAM(S): at 13:25

## 2021-07-19 RX ADMIN — SIMETHICONE 80 MILLIGRAM(S): 80 TABLET, CHEWABLE ORAL at 13:22

## 2021-07-19 RX ADMIN — AMLODIPINE BESYLATE 5 MILLIGRAM(S): 2.5 TABLET ORAL at 10:34

## 2021-07-19 RX ADMIN — OXYCODONE HYDROCHLORIDE 10 MILLIGRAM(S): 5 TABLET ORAL at 01:49

## 2021-07-19 RX ADMIN — Medication 1000 MILLIGRAM(S): at 13:24

## 2021-07-19 RX ADMIN — Medication 1000 MILLIGRAM(S): at 21:36

## 2021-07-19 RX ADMIN — Medication 1 APPLICATION(S): at 05:15

## 2021-07-19 RX ADMIN — Medication 1000 MILLIGRAM(S): at 01:43

## 2021-07-19 RX ADMIN — POLYETHYLENE GLYCOL 3350 17 GRAM(S): 17 POWDER, FOR SOLUTION ORAL at 13:23

## 2021-07-19 RX ADMIN — CHLORHEXIDINE GLUCONATE 15 MILLILITER(S): 213 SOLUTION TOPICAL at 08:16

## 2021-07-19 RX ADMIN — Medication 1 APPLICATION(S): at 10:35

## 2021-07-19 RX ADMIN — OXYMETAZOLINE HYDROCHLORIDE 2 SPRAY(S): 0.5 SPRAY NASAL at 17:56

## 2021-07-19 RX ADMIN — OXYMETAZOLINE HYDROCHLORIDE 2 SPRAY(S): 0.5 SPRAY NASAL at 05:15

## 2021-07-19 RX ADMIN — HEPARIN SODIUM 5000 UNIT(S): 5000 INJECTION INTRAVENOUS; SUBCUTANEOUS at 05:27

## 2021-07-19 RX ADMIN — Medication 1000 MILLIGRAM(S): at 22:36

## 2021-07-19 RX ADMIN — OXYCODONE HYDROCHLORIDE 10 MILLIGRAM(S): 5 TABLET ORAL at 05:28

## 2021-07-19 RX ADMIN — Medication 2000 MILLIGRAM(S): at 05:27

## 2021-07-19 RX ADMIN — Medication 1 APPLICATION(S): at 17:56

## 2021-07-19 RX ADMIN — Medication 100 MILLIGRAM(S): at 05:27

## 2021-07-19 RX ADMIN — Medication 81 MILLIGRAM(S): at 13:24

## 2021-07-19 RX ADMIN — CHLORHEXIDINE GLUCONATE 15 MILLILITER(S): 213 SOLUTION TOPICAL at 21:28

## 2021-07-19 RX ADMIN — SERTRALINE 100 MILLIGRAM(S): 25 TABLET, FILM COATED ORAL at 13:22

## 2021-07-19 RX ADMIN — TAMSULOSIN HYDROCHLORIDE 0.8 MILLIGRAM(S): 0.4 CAPSULE ORAL at 21:27

## 2021-07-19 RX ADMIN — OXYCODONE HYDROCHLORIDE 10 MILLIGRAM(S): 5 TABLET ORAL at 01:43

## 2021-07-19 RX ADMIN — OXYCODONE HYDROCHLORIDE 5 MILLIGRAM(S): 5 TABLET ORAL at 22:20

## 2021-07-19 RX ADMIN — PANTOPRAZOLE SODIUM 40 MILLIGRAM(S): 20 TABLET, DELAYED RELEASE ORAL at 13:23

## 2021-07-19 RX ADMIN — OXYCODONE HYDROCHLORIDE 5 MILLIGRAM(S): 5 TABLET ORAL at 18:09

## 2021-07-19 RX ADMIN — Medication 125 MILLIGRAM(S): at 10:34

## 2021-07-19 RX ADMIN — CARVEDILOL PHOSPHATE 6.25 MILLIGRAM(S): 80 CAPSULE, EXTENDED RELEASE ORAL at 10:34

## 2021-07-19 RX ADMIN — Medication 3 MILLIGRAM(S): at 21:27

## 2021-07-19 NOTE — PROGRESS NOTE ADULT - SUBJECTIVE AND OBJECTIVE BOX
SUBJECTIVE: Afebrile. Reported very small BM. Non-ambulatory at the moment.    amLODIPine   Tablet 5 milliGRAM(s) Oral daily  aspirin  chewable 81 milliGRAM(s) Oral daily  carvedilol 6.25 milliGRAM(s) Oral every 12 hours  ceFAZolin  Injectable. 2000 milliGRAM(s) IV Push every 8 hours  heparin   Injectable 5000 Unit(s) SubCutaneous every 8 hours  hydrALAZINE Injectable 5 milliGRAM(s) IV Push every 4 hours PRN  metroNIDAZOLE  IVPB 500 milliGRAM(s) IV Intermittent every 8 hours  tamsulosin 0.8 milliGRAM(s) Oral at bedtime  vancomycin    Solution 125 milliGRAM(s) Oral every 12 hours      Vital Signs Last 24 Hrs  T(C): 36.6 (2021 04:29), Max: 37.3 (2021 14:09)  T(F): 97.8 (2021 04:29), Max: 99.2 (2021 14:09)  HR: 62 (2021 04:40) (56 - 85)  BP: 143/69 (2021 04:40) (142/77 - 196/84)  BP(mean): 99 (2021 04:40) (99 - 124)  RR: 18 (2021 04:40) (16 - 22)  SpO2: 95% (2021 04:40) (93% - 98%)  I&O's Detail    2021 07:01  -  2021 07:00  --------------------------------------------------------  IN:    Enteral Tube Flush: 900 mL    IV PiggyBack: 100 mL    Jevity 1.2: 1680 mL  Total IN: 2680 mL    OUT:    Bulb (mL): 0 mL    Bulb (mL): 30 mL    Indwelling Catheter - Urethral (mL): 2195 mL    Intermittent Catheterization - Urethral (mL): 550 mL    VAC (Vacuum Assisted Closure) System (mL): 10 mL  Total OUT: 2785 mL    Total NET: -105 mL          Physical Exam:  General: No acute distress, resting comfortably in bed  Pulm: Nonlabored breathing, no respiratory distress  : medel in place draining clear yellow urine.      LABS:                        9.3    11.71 )-----------( 200      ( 2021 06:27 )             30.0     07-19    142  |  110<H>  |  27<H>  ----------------------------<  119<H>  4.5   |  20<L>  |  0.97    Ca    8.2<L>      2021 06:27  Phos  3.6     07-  Mg     2.4     -19        Urinalysis Basic - ( 2021 11:33 )    Color: Yellow / Appearance: Clear / S.020 / pH: x  Gluc: x / Ketone: NEGATIVE  / Bili: Negative / Urobili: 0.2 E.U./dL   Blood: x / Protein: NEGATIVE mg/dL / Nitrite: NEGATIVE   Leuk Esterase: NEGATIVE / RBC: x / WBC x   Sq Epi: x / Non Sq Epi: x / Bacteria: x        RADIOLOGY & ADDITIONAL STUDIES:

## 2021-07-19 NOTE — BEHAVIORAL HEALTH ASSESSMENT NOTE - SUMMARY
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, dental implants, R fibula MVFF, R thigh STSG 7/14. Psychiatry was consulted to evaluate the patient for depression and recent suicidal statements made to the staff ("can somebody shoot me"). Patient currently presents with labile mood and recent visual hallucinations (per patient resolved) after recent surgical treatment suggestive of delirium, likely secondary to anesthesia +/benzos .  Patient denies any SI and presents future oriented. He presents with some chronic personality traits that put him at risk of becoming distressed in the hospital (feeling lonely/abandoned).  Plan:  -medical workup for delirium per the primary team  -discontinue lorazapam and zaleplon as they can trigger/worsen delirium  -start seroquel 25mg po q6h prn agitation  -patient would benefit from an increase in his dosage of sertraline (to 125mg) however he currently refuses; continue to provide psychoeducation on the benefits of the treatment  -start melatonin 3mg po qhs  -no need to 1:1 at this time (patient denies any SI; passive statements made in context of frustration with pain)  -CL to follow

## 2021-07-19 NOTE — SWALLOW BEDSIDE ASSESSMENT ADULT - SWALLOW EVAL: FEEDING ASSISTANCE
Pt very anxious and reported to be "overwhelmed."  As such, he would benefit from encouragement to increase po intake./minimal assistance required

## 2021-07-19 NOTE — PROGRESS NOTE ADULT - ASSESSMENT
71M HTN, HLD, depression, BPH, prostate ca (s/p XRT and ADT), presents with R maxilla ameloblastoma now s/p R maxillectomy, dental implants, R fibula MVFF, R thigh STSG 7/14. Urology consulted for urinary retention. Pt reports previous epsiode of retention after surgery.     Recommendations:  - TOV per primary team, recommend when ambulating and having regular BMs  - Flomax  - bowel regimen per primary  - Can f/u with his own urologist (Dr. Donnelly) at [facility] [place] or with Dr. Akins   71M HTN, HLD, depression, BPH, prostate ca (s/p XRT and ADT), presents with R maxilla ameloblastoma now s/p R maxillectomy, dental implants, R fibula MVFF, R thigh STSG 7/14. Urology consulted for urinary retention. Pt reports previous episode of retention after surgery. Currently non-ambulatory and without regular BMs. UA negative. Cr normal.     Recommendations:  - TOV per primary team, recommend when ambulating and having regular BMs  - Flomax  - bowel regimen per primary  - [place] [facility] [place]

## 2021-07-19 NOTE — BEHAVIORAL HEALTH ASSESSMENT NOTE - RISK ASSESSMENT
low risk: future oriented thinking, motivation for treatment, good support system, good insight into his personality traits; Low Acute Suicide Risk

## 2021-07-19 NOTE — BEHAVIORAL HEALTH ASSESSMENT NOTE - SUICIDE PROTECTIVE FACTORS
Responsibility to family and others/Identifies reasons for living/Has future plans/Supportive social network of family or friends/Fear of death or the actual act of killing self

## 2021-07-19 NOTE — SWALLOW BEDSIDE ASSESSMENT ADULT - SLP GENERAL OBSERVATIONS
Pt received sleeping in bed but easily roused to voice.  Pt awake and alert throughout my visit but appeared anxious. (+) NGT in place.  Pt also c/o persistent abdominal discomfort ( "gas") and level "4" leg pain when swallowing.  Denied pain in the head/neck region.  Speech and voice functions generally within functional limits.

## 2021-07-19 NOTE — PROGRESS NOTE ADULT - SUBJECTIVE AND OBJECTIVE BOX
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 Pt now s/p R maxillectomy, dental implants, R fibula MVFF, R thigh STSG 7/14.    07-15 Patient seen at bedside and discussed with attending. flap checks with strong doppler signal. nMAPs 70s-80s. no events overnight.   07-16 Patient seen at bedside and discussed with attending. no acute events overnight. flap checks with strong doppler. failed TOV, getting straight cath. tolerating diet.   07-17 Patient seen at bedside and discussed with attending. flap check strong doppler signal. MAPs at goal. continues to get straight cath 2/2 unable to void.   07-18 Patient seen at bedside and discussed with attending. flap check strong doppler signal. in poor spirits this AM. continues to get straight cathed. complaining of back pain this AM. BM x1 s/p enema.   07-19 Patient seen and evaluated at bedside. Downgraded to stepdown unit yesterday. Spirits improved this AM. Denies any significant pain. S/p medel placement by urology. Psych consult pending. No active complaints. BMx1.    ALLERGIES  sulfa drugs (Unknown)    MEDICATIONS  (STANDING):  acetaminophen    Suspension .. 1000 milliGRAM(s) Oral every 6 hours  amLODIPine   Tablet 5 milliGRAM(s) Oral daily  aspirin  chewable 81 milliGRAM(s) Oral daily  BACItracin   Ointment 1 Application(s) Topical every 12 hours  carvedilol 6.25 milliGRAM(s) Oral every 12 hours  ceFAZolin  Injectable. 2000 milliGRAM(s) IV Push every 8 hours  chlorhexidine 0.12% Liquid 15 milliLiter(s) Oral Mucosa <User Schedule>  dextrose 40% Gel 15 Gram(s) Oral once  dextrose 5%. 1000 milliLiter(s) (50 mL/Hr) IV Continuous <Continuous>  dextrose 5%. 1000 milliLiter(s) (100 mL/Hr) IV Continuous <Continuous>  dextrose 50% Injectable 25 Gram(s) IV Push once  dextrose 50% Injectable 12.5 Gram(s) IV Push once  dextrose 50% Injectable 25 Gram(s) IV Push once  glucagon  Injectable 1 milliGRAM(s) IntraMuscular once  heparin   Injectable 5000 Unit(s) SubCutaneous every 8 hours  insulin lispro (ADMELOG) corrective regimen sliding scale   SubCutaneous every 6 hours  metroNIDAZOLE  IVPB 500 milliGRAM(s) IV Intermittent every 8 hours  oxymetazoline 0.05% Nasal Spray 2 Spray(s) Both Nostrils every 12 hours  pantoprazole  Injectable 40 milliGRAM(s) IV Push daily  petrolatum white Ointment 1 Application(s) Topical every 12 hours  polyethylene glycol 3350 17 Gram(s) Oral daily  senna Syrup 10 milliLiter(s) Oral at bedtime  sertraline 100 milliGRAM(s) Oral daily  tamsulosin 0.8 milliGRAM(s) Oral at bedtime  vancomycin    Solution 125 milliGRAM(s) Oral every 12 hours    MEDICATIONS  (PRN):  bisacodyl Suppository 10 milliGRAM(s) Rectal daily PRN Constipation  hydrALAZINE Injectable 5 milliGRAM(s) IV Push every 4 hours PRN SBP > 170  LORazepam   Injectable 0.5 milliGRAM(s) IV Push every 8 hours PRN Anxiety  oxyCODONE    Solution 5 milliGRAM(s) Oral every 4 hours PRN Moderate Pain (4 - 6)  oxyCODONE    Solution 10 milliGRAM(s) Oral every 4 hours PRN Severe Pain (7 - 10)  zaleplon 5 milliGRAM(s) Oral at bedtime PRN Insomnia                                                9.3                   Neurophils% (auto):   x      (07-19 @ 06:27):    11.71)-----------(200          Lymphocytes% (auto):  x                                             30.0                   Eosinphils% (auto):   x        Manual%: Neutrophils x    ; Lymphocytes x    ; Eosinophils x    ; Bands%: x    ; Blasts x          07-19    142  |  110<H>  |  27<H>  ----------------------------<  119<H>  4.5   |  20<L>  |  0.97    Ca    8.2<L>      19 Jul 2021 06:27  Phos  3.6     07-19  Mg     2.4     07-19 07-18 @ 07:01  -  07-19 @ 07:00  --------------------------------------------------------  IN:    Enteral Tube Flush: 900 mL    IV PiggyBack: 100 mL    Jevity 1.2: 1680 mL  Total IN: 2680 mL    OUT:    Bulb (mL): 0 mL    Bulb (mL): 30 mL    Indwelling Catheter - Urethral (mL): 2195 mL    Intermittent Catheterization - Urethral (mL): 550 mL    VAC (Vacuum Assisted Closure) System (mL): 10 mL  Total OUT: 2785 mL    Total NET: -105 mL        Vital Signs Last 24 Hrs  T(C): 36.6 (19 Jul 2021 04:29), Max: 37.3 (18 Jul 2021 14:09)  T(F): 97.8 (19 Jul 2021 04:29), Max: 99.2 (18 Jul 2021 14:09)  HR: 62 (19 Jul 2021 04:40) (56 - 85)  BP: 143/69 (19 Jul 2021 04:40) (142/77 - 196/84)  BP(mean): 99 (19 Jul 2021 04:40) (99 - 124)  RR: 18 (19 Jul 2021 04:40) (16 - 22)  SpO2: 95% (19 Jul 2021 04:40) (93% - 98%)          Physical Exam: NAD  R nasal cavity with nasal trumpet and NGT sutured to septum  Intraoral skin paddle warm, well-perfused, incisions c/d/i with triphasic doppler signal  Neck incisions c/d/i, IRVING with SS fluid   Breathing comfortably on RA  R thigh STSG donor site dressed with tegederm  R fibula donor site wrapped with cast/ACE, IRVING x1 with blood, wound vac holding suction

## 2021-07-19 NOTE — SWALLOW BEDSIDE ASSESSMENT ADULT - SLP PERTINENT HISTORY OF CURRENT PROBLEM
71M with R maxilla ameloblastoma s/p R maxillectomy, dental implants, R fibula MVFF, R thigh STSG on 7/14/21.

## 2021-07-19 NOTE — BEHAVIORAL HEALTH ASSESSMENT NOTE - HPI (INCLUDE ILLNESS QUALITY, SEVERITY, DURATION, TIMING, CONTEXT, MODIFYING FACTORS, ASSOCIATED SIGNS AND SYMPTOMS)
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, dental implants, R fibula MVFF, R thigh STSG . Psychiatry was consulted to evaluate the patient for depression and recent suicidal statements made to the staff ("can somebody shoot me"). Patient was seen at bedside. He presents in a hospital chair, appears to be in discomfort. He reports that since his surgery he has been feeling more depressed and scared. States that after surgery he had several episodes of visual hallucinations (are subsiding). He also has been feeling lonely and thinking a lot about losing his mother ( when he was 6 y/o) and his wife of 26 years (15years ago). Patient states that prior to the surgery he was doing well and denies any depressive symptoms. He describes himself as  being someone who "needs attention as he did not get attention from his mother growing up". Patient adamantly denies any SI or history of SI/SA and states that his statements was in context of feeling overwhelmed with the pain after surgery.   Patient states that he saw a psychiatrist in the past and was prescribed sertraline 100mg po daily. Patient reports that he does not think that it's helping anymore but does not want to increase the dosage (was at 200mg and has been tapering it off). Patient reports that he does not need psychotherapy while inpatient but would like to have "someone hold his hand" and speak to him.  Patient reports that his support system includes his 3 adult children and his fiancee but he does not want to burden them. 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, dental implants, R fibula MVFF, R thigh STSG . Psychiatry was consulted to evaluate the patient for depression and recent suicidal statements made to the staff during the weekend("can somebody shoot me"). Patient was seen at bedside. He presents in a hospital chair, appears to be in discomfort. He reports that since his surgery he has been feeling more depressed and scared. States that after surgery he had several episodes of visual hallucinations (are subsiding). He also has been feeling lonely and thinking a lot about losing his mother ( when he was 4 y/o) and his wife of 26 years (15years ago). Patient states that prior to the surgery he was doing well and denies any depressive symptoms. He describes himself as  being someone who "needs attention as he did not get attention from his mother growing up". Patient adamantly denies any SI or history of SI/SA and states that his statements was in context of feeling overwhelmed with the pain after surgery.   Patient states that he saw a psychiatrist in the past and was prescribed sertraline 100mg po daily. Patient reports that he does not think that it's helping anymore but does not want to increase the dosage (was at 200mg and has been tapering it off). Patient reports that he does not need psychotherapy while inpatient but would like to have "someone hold his hand" and speak to him.  Patient reports that his support system includes his 3 adult children and his fiancee but he does not want to burden them. 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, dental implants, R fibula MVFF, R thigh STSG . Psychiatry was consulted to evaluate the patient for depression and recent suicidal statements made to the staff during the weekend("can somebody shoot me"). Patient was seen at bedside. He presents in a hospital chair, appears to be in discomfort. He reports that since his surgery he has been feeling more depressed and scared. States that after surgery he had several episodes of visual hallucinations (are subsiding). He also has been feeling lonely and thinking a lot about losing his mother ( when he was 6 y/o) and his wife of 26 years (15years ago). Patient states that prior to the surgery he was doing well and denies any depressive symptoms. He describes himself as  being someone who "needs attention as he did not get attention from his mother growing up". Patient adamantly denies any SI or history of SI/SA and states that his statements was in context of feeling overwhelmed with the pain after surgery.   Patient states that he saw a psychiatrist in the past and was prescribed sertraline 100mg po daily. Patient reports that he does not think that it's helping anymore but does not want to increase the dosage (was at 200mg and has been tapering it off). Patient reports that he does not need psychotherapy while inpatient but would like to have "someone hold his hand" and speak to him.  Patient reports that his support system includes his 3 adult children and his fiancee but he does not want to burden them.  Chart review shows that patient received 1 dosage of lorazepam prn and is currently on zaleplon for sleep

## 2021-07-19 NOTE — PROGRESS NOTE ADULT - ASSESSMENT
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, dental implants, R fibula MVFF, R thigh STSG 7/14    - Tylenol/oxycodone for pain PRN  - RN flap checks q4h  - ASA 81 via NGT daily  - SQH   - Monitor IRVING drain outputs   - f/u uro consult  - f/u psych consult  - humidified facetent  - ativan PRN  - Monitor BP, maintain MAPs   - C/w Hydralazine  - add Amlodipine for BP  - Avoid pressors  - 500 cc bolus NS PRN for MAP maintenance   - O2 support as indicated  - continue bolus tube feeds  - advance to goal per nutrition recs   - Bowel regimen while receiving opiates for pain  - PPI, anti-emetics   - Monitor lytes; replace PRN  - Monitor temp, WBC  - c/w Vanco/flagyl/kefzol  - Monitor leg incision   - IRVING to self suction, wound vac to suction  - toe touch ambulation    Discussed with Dr. Schmid

## 2021-07-19 NOTE — SWALLOW BEDSIDE ASSESSMENT ADULT - CONSISTENCIES ADMINISTERED
ice chips x 2, 3 oz water via tsp and independent cup sips, puree via tsp x 2.  Pt required constant encouragement to participate in clinical exam and take po trials.  Pt c/o puree being "too thick" and refused further po after 2 trials of baby applesauce

## 2021-07-20 LAB
GLUCOSE BLDC GLUCOMTR-MCNC: 118 MG/DL — HIGH (ref 70–99)
GLUCOSE BLDC GLUCOMTR-MCNC: 118 MG/DL — HIGH (ref 70–99)
GLUCOSE BLDC GLUCOMTR-MCNC: 132 MG/DL — HIGH (ref 70–99)
GLUCOSE BLDC GLUCOMTR-MCNC: 136 MG/DL — HIGH (ref 70–99)

## 2021-07-20 PROCEDURE — 74018 RADEX ABDOMEN 1 VIEW: CPT | Mod: 26

## 2021-07-20 RX ORDER — MINERAL OIL
133 OIL (ML) MISCELLANEOUS ONCE
Refills: 0 | Status: COMPLETED | OUTPATIENT
Start: 2021-07-20 | End: 2021-07-20

## 2021-07-20 RX ORDER — MAGNESIUM HYDROXIDE 400 MG/1
30 TABLET, CHEWABLE ORAL DAILY
Refills: 0 | Status: DISCONTINUED | OUTPATIENT
Start: 2021-07-20 | End: 2021-07-21

## 2021-07-20 RX ORDER — QUETIAPINE FUMARATE 200 MG/1
25 TABLET, FILM COATED ORAL EVERY 6 HOURS
Refills: 0 | Status: DISCONTINUED | OUTPATIENT
Start: 2021-07-20 | End: 2021-07-21

## 2021-07-20 RX ADMIN — CARVEDILOL PHOSPHATE 6.25 MILLIGRAM(S): 80 CAPSULE, EXTENDED RELEASE ORAL at 21:20

## 2021-07-20 RX ADMIN — CHLORHEXIDINE GLUCONATE 15 MILLILITER(S): 213 SOLUTION TOPICAL at 05:09

## 2021-07-20 RX ADMIN — Medication 133 MILLILITER(S): at 09:50

## 2021-07-20 RX ADMIN — SERTRALINE 100 MILLIGRAM(S): 25 TABLET, FILM COATED ORAL at 12:11

## 2021-07-20 RX ADMIN — Medication 1000 MILLIGRAM(S): at 18:23

## 2021-07-20 RX ADMIN — OXYCODONE HYDROCHLORIDE 10 MILLIGRAM(S): 5 TABLET ORAL at 05:13

## 2021-07-20 RX ADMIN — OXYMETAZOLINE HYDROCHLORIDE 2 SPRAY(S): 0.5 SPRAY NASAL at 05:09

## 2021-07-20 RX ADMIN — PANTOPRAZOLE SODIUM 40 MILLIGRAM(S): 20 TABLET, DELAYED RELEASE ORAL at 12:11

## 2021-07-20 RX ADMIN — Medication 81 MILLIGRAM(S): at 12:11

## 2021-07-20 RX ADMIN — HEPARIN SODIUM 5000 UNIT(S): 5000 INJECTION INTRAVENOUS; SUBCUTANEOUS at 15:29

## 2021-07-20 RX ADMIN — Medication 1000 MILLIGRAM(S): at 12:10

## 2021-07-20 RX ADMIN — AMLODIPINE BESYLATE 5 MILLIGRAM(S): 2.5 TABLET ORAL at 05:09

## 2021-07-20 RX ADMIN — CHLORHEXIDINE GLUCONATE 15 MILLILITER(S): 213 SOLUTION TOPICAL at 21:22

## 2021-07-20 RX ADMIN — Medication 1 APPLICATION(S): at 21:22

## 2021-07-20 RX ADMIN — SENNA PLUS 10 MILLILITER(S): 8.6 TABLET ORAL at 00:23

## 2021-07-20 RX ADMIN — Medication 1 APPLICATION(S): at 05:11

## 2021-07-20 RX ADMIN — OXYMETAZOLINE HYDROCHLORIDE 2 SPRAY(S): 0.5 SPRAY NASAL at 18:23

## 2021-07-20 RX ADMIN — TAMSULOSIN HYDROCHLORIDE 0.8 MILLIGRAM(S): 0.4 CAPSULE ORAL at 21:20

## 2021-07-20 RX ADMIN — MAGNESIUM HYDROXIDE 30 MILLILITER(S): 400 TABLET, CHEWABLE ORAL at 12:10

## 2021-07-20 RX ADMIN — OXYCODONE HYDROCHLORIDE 10 MILLIGRAM(S): 5 TABLET ORAL at 17:37

## 2021-07-20 RX ADMIN — OXYCODONE HYDROCHLORIDE 10 MILLIGRAM(S): 5 TABLET ORAL at 06:00

## 2021-07-20 RX ADMIN — QUETIAPINE FUMARATE 25 MILLIGRAM(S): 200 TABLET, FILM COATED ORAL at 01:44

## 2021-07-20 RX ADMIN — OXYCODONE HYDROCHLORIDE 5 MILLIGRAM(S): 5 TABLET ORAL at 22:00

## 2021-07-20 RX ADMIN — HEPARIN SODIUM 5000 UNIT(S): 5000 INJECTION INTRAVENOUS; SUBCUTANEOUS at 05:09

## 2021-07-20 RX ADMIN — Medication 1 APPLICATION(S): at 09:50

## 2021-07-20 RX ADMIN — Medication 1000 MILLIGRAM(S): at 13:00

## 2021-07-20 RX ADMIN — OXYCODONE HYDROCHLORIDE 10 MILLIGRAM(S): 5 TABLET ORAL at 12:10

## 2021-07-20 RX ADMIN — OXYCODONE HYDROCHLORIDE 10 MILLIGRAM(S): 5 TABLET ORAL at 13:00

## 2021-07-20 RX ADMIN — POLYETHYLENE GLYCOL 3350 17 GRAM(S): 17 POWDER, FOR SOLUTION ORAL at 12:11

## 2021-07-20 RX ADMIN — Medication 1000 MILLIGRAM(S): at 17:15

## 2021-07-20 RX ADMIN — Medication 1000 MILLIGRAM(S): at 00:23

## 2021-07-20 RX ADMIN — CHLORHEXIDINE GLUCONATE 15 MILLILITER(S): 213 SOLUTION TOPICAL at 15:29

## 2021-07-20 RX ADMIN — OXYCODONE HYDROCHLORIDE 5 MILLIGRAM(S): 5 TABLET ORAL at 21:19

## 2021-07-20 RX ADMIN — SIMETHICONE 80 MILLIGRAM(S): 80 TABLET, CHEWABLE ORAL at 01:57

## 2021-07-20 RX ADMIN — OXYCODONE HYDROCHLORIDE 10 MILLIGRAM(S): 5 TABLET ORAL at 18:30

## 2021-07-20 RX ADMIN — Medication 1 APPLICATION(S): at 18:23

## 2021-07-20 RX ADMIN — Medication 3 MILLIGRAM(S): at 21:21

## 2021-07-20 RX ADMIN — Medication 1000 MILLIGRAM(S): at 06:09

## 2021-07-20 RX ADMIN — Medication 1000 MILLIGRAM(S): at 23:29

## 2021-07-20 RX ADMIN — Medication 1000 MILLIGRAM(S): at 05:09

## 2021-07-20 RX ADMIN — SIMETHICONE 80 MILLIGRAM(S): 80 TABLET, CHEWABLE ORAL at 12:11

## 2021-07-20 RX ADMIN — CARVEDILOL PHOSPHATE 6.25 MILLIGRAM(S): 80 CAPSULE, EXTENDED RELEASE ORAL at 10:20

## 2021-07-20 RX ADMIN — HEPARIN SODIUM 5000 UNIT(S): 5000 INJECTION INTRAVENOUS; SUBCUTANEOUS at 21:21

## 2021-07-20 RX ADMIN — Medication 1000 MILLIGRAM(S): at 01:23

## 2021-07-20 NOTE — PROGRESS NOTE ADULT - ASSESSMENT
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, dental implants, R fibula MVFF, R thigh STSG 7/14    - Remove neck IRVING this AM  - Tylenol/oxycodone for pain PRN  - RN flap checks q4h  - Resident flap checks q12h  - TID peridex mouth washes  - TID ambulation  - Advance to full weight-bearing today  - ASA 81 via NGT daily  - SQH   - Monitor IRVING drain outputs   - Uro consult- TOV per primary team  - psych consult- d/c ativan and zaleplon, start seroquel 25mg PRN, melatonin 3mg qhs  - humidified facetent  - Monitor BP, maintain MAPs   - C/w Hydralazine for BP >180  - add Amlodipine for BP  - Avoid pressors  - 500 cc bolus NS PRN for MAP maintenance   - O2 support as indicated  - continue bolus tube feeds  - advance to goal per nutrition recs   - Bowel regimen while receiving opiates for pain  - PPI, anti-emetics   - Monitor lytes; replace PRN  - Monitor temp, WBC  - s/p Vanco/flagyl/kefzol (7/14-7/19)  - Monitor leg incision   - IRVING to self suction, wound vac to suction  - full weight bearing ambulation  - F/u ambulation w/ PT    Discussed with Dr. Schmid

## 2021-07-20 NOTE — CHART NOTE - NSCHARTNOTEFT_GEN_A_CORE
Admitting Diagnosis:   Patient is a 71y old  Male who presents with a chief complaint of s/p maxillectomy with fibular MVFF (20 Jul 2021 08:07)      PAST MEDICAL & SURGICAL HISTORY:  Cancer of prostate    History of hormone therapy  testosterone blockers  May 2021    Depression, unspecified depression type    Hypertension, unspecified type    Hyperlipidemia, unspecified hyperlipidemia type    H/O gastroesophageal reflux (GERD)    Benign neoplasm    Clostridium difficile diarrhea  JAn 2021    Inguinal hernia, right    History of umbilical hernia repair    History of endoscopic sinus surgery        Current Nutrition Order:  Jevity 1.2 @280ml/hr x1hr Q4H +1 LPS/day via NGT. Provides 1680ml TV, 2180kcal, 108g pro, 1356ml FW.  *pt refused feeding this morning 2/2 abdominal discomfort. Although not apparent whether this is a generalized discomfort or its exacerbated after a feeding. Pt could not specify.     PO Intake: Good (%) [   ]  Fair (50-75%) [   ] Poor (<25%) [   ] N/A may adv. tomorrow per SLP    GI Issues:   C/o distension, bloating, gassy feeling. Last BM 7/18  Ordered for dulcolax, milk of magnesia, mineral oil, simethicone, miralax, senna, protonix  *confirmed w/ RN pt is receiving bowel regimen  Denies N/V with feeds    Pain:  Biggest complaint at this time is abdominal discomfort  Being medically managed.     Skin Integrity:  surgical incision  Fritz score 16    Labs:   07-19    142  |  110<H>  |  27<H>  ----------------------------<  119<H>  4.5   |  20<L>  |  0.97    Ca    8.2<L>      19 Jul 2021 06:27  Phos  3.6     07-19  Mg     2.4     07-19      CAPILLARY BLOOD GLUCOSE      POCT Blood Glucose.: 136 mg/dL (20 Jul 2021 11:18)  POCT Blood Glucose.: 118 mg/dL (20 Jul 2021 06:58)  POCT Blood Glucose.: 118 mg/dL (20 Jul 2021 00:13)  POCT Blood Glucose.: 135 mg/dL (19 Jul 2021 17:37)      Medications:  MEDICATIONS  (STANDING):  acetaminophen    Suspension .. 1000 milliGRAM(s) Oral every 6 hours  amLODIPine   Tablet 5 milliGRAM(s) Oral daily  aspirin  chewable 81 milliGRAM(s) Oral daily  BACItracin   Ointment 1 Application(s) Topical every 12 hours  bisacodyl Suppository 10 milliGRAM(s) Rectal daily  carvedilol 6.25 milliGRAM(s) Oral every 12 hours  chlorhexidine 0.12% Liquid 15 milliLiter(s) Oral Mucosa <User Schedule>  dextrose 40% Gel 15 Gram(s) Oral once  dextrose 5%. 1000 milliLiter(s) (50 mL/Hr) IV Continuous <Continuous>  dextrose 5%. 1000 milliLiter(s) (100 mL/Hr) IV Continuous <Continuous>  dextrose 50% Injectable 25 Gram(s) IV Push once  dextrose 50% Injectable 12.5 Gram(s) IV Push once  dextrose 50% Injectable 25 Gram(s) IV Push once  glucagon  Injectable 1 milliGRAM(s) IntraMuscular once  heparin   Injectable 5000 Unit(s) SubCutaneous every 8 hours  insulin lispro (ADMELOG) corrective regimen sliding scale   SubCutaneous every 6 hours  magnesium hydroxide Suspension 30 milliLiter(s) Oral daily  melatonin 3 milliGRAM(s) Oral at bedtime  oxymetazoline 0.05% Nasal Spray 2 Spray(s) Both Nostrils every 12 hours  pantoprazole  Injectable 40 milliGRAM(s) IV Push daily  petrolatum white Ointment 1 Application(s) Topical every 12 hours  polyethylene glycol 3350 17 Gram(s) Oral daily  senna Syrup 10 milliLiter(s) Oral at bedtime  sertraline 100 milliGRAM(s) Oral daily  tamsulosin 0.8 milliGRAM(s) Oral at bedtime    MEDICATIONS  (PRN):  hydrALAZINE Injectable 5 milliGRAM(s) IV Push every 4 hours PRN SBP > 170  oxyCODONE    Solution 5 milliGRAM(s) Oral every 4 hours PRN Moderate Pain (4 - 6)  oxyCODONE    Solution 10 milliGRAM(s) Oral every 4 hours PRN Severe Pain (7 - 10)  QUEtiapine 25 milliGRAM(s) Oral every 6 hours PRN anxiety/agitation/confusion  simethicone 80 milliGRAM(s) Chew every 8 hours PRN Gas    Admitted Anthropometrics:  Height: 5'9" Weight: 200lbs, IBW 160lbs+/-10%, %%, BMI 29.5    Weight Change:   Pt noting his weight 196lbs (5/25) now admitting at 199lbs (7/14) noting a +3lb/ 1.5% weight gain over 2 months. Pt reporting good appetite PTA- denied any changes.    Estimated energy needs:   IBW used for calculations as pt >120% of IBW (125%).   Nutrient needs based on [facility] standards of care for maintenance in older adults.   Needs adjusted for post op healing.  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 Currently NPO w/ intermittent tube feeds. Declined tube feeds this am 2/2 abdominal discomfort. Feeling bloated and gassy. Ordered for simethicone, milk of magnesia, mineral oil enema, miralax, senna. Last BM was from 7/18 but pt reports feeling constipated. Discussed w/ pt receiving all bowel medications and then trialing feeds on trickle/continuous vs high volume intermittent. Pt clearly frustrated by hospital stay and feeling overwhelmed. Of note, SLP recommending full liquids once cleared by ENT. Consider switching to Vital 1.5 continuous feeds. Anxiety is likely contributing to further GI upset. Notable labs: POCT , 118, 135, 102, BUN 27. Skin: surgical incision, fritz score 16; GI: distension, reports of abdominal discomfort. RD to follow.     Previous Nutrition Diagnosis:  Increased nutrient needs RT increased demand for protein 2/2 wound healing AEB s/p R maxillectomy, dental implants, R fibula MVFF, R thigh STSG.   Active [ x  ]  Resolved [   ]    If resolved, new PES:     Goal:  Pt to consistently meet % of estimated needs via most appropriate route    Recommendations:  1. Consider switching to Vital 1.5 continuous feeds. Anxiety is likely contributing to further GI upset.   Recommend Vital 1.5 @58ml/hr x24hrs via NGT. Provides 1392kcal, 2088kcal, 93g pro, 1063ml FW.   2. Bowel regimen per team. Please ensure pt is having 1 BM every 1-2 days. Continue w/ simethicone.   3. Consider further psych evaluation to assist w/ anxiety.   *d/w team. Order place for MD verification.     Education:   Switching to continuous feeds.     Risk Level: High [ x  ] Moderate [   ] Low [   ]

## 2021-07-20 NOTE — BRIEF OPERATIVE NOTE - NSICDXBRIEFPROCEDURE_GEN_ALL_CORE_FT
PROCEDURES:  Partial maxillectomy 20-Jul-2021 10:27:11  Glynn Rosas  Insertion of osseointegrated dental implant 20-Jul-2021 10:27:40  Glynn Rosas  Reconstruction using fibular free flap 20-Jul-2021 10:27:57  Glynn Rosas  Application of split thickness skin graft to right ankle 20-Jul-2021 10:29:47  Glynn Rosas  
PROCEDURES:  Partial maxillectomy 20-Jul-2021 10:27:11  Glynn Rosas  Insertion of osseointegrated dental implant 20-Jul-2021 10:27:40  Glynn Rosas  Reconstruction using fibular free flap 20-Jul-2021 10:27:57  Glynn Rosas  Application of split thickness skin graft to right ankle 20-Jul-2021 10:29:47  Glynn Rosas

## 2021-07-20 NOTE — BRIEF OPERATIVE NOTE - OPERATION/FINDINGS
Ameloblastoma of right maxilla, fibular free flap reconstruction, dental implants placed, and STSG to right lower extremity after harvest from right thigh
Maxillary mass, microvascular fibular free flap reconstruction, dental implants, split thickness skin graft harvested from right thigh and placed on right lower extremity

## 2021-07-20 NOTE — PROGRESS NOTE ADULT - SUBJECTIVE AND OBJECTIVE BOX
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 Pt now s/p R maxillectomy, dental implants, R fibula MVFF, R thigh STSG 7/14.    07-15 Patient seen at bedside and discussed with attending. flap checks with strong doppler signal. nMAPs 70s-80s. no events overnight.   07-16 Patient seen at bedside and discussed with attending. no acute events overnight. flap checks with strong doppler. failed TOV, getting straight cath. tolerating diet.   07-17 Patient seen at bedside and discussed with attending. flap check strong doppler signal. MAPs at goal. continues to get straight cath 2/2 unable to void.   07-18 Patient seen at bedside and discussed with attending. flap check strong doppler signal. in poor spirits this AM. continues to get straight cathed. complaining of back pain this AM. BM x1 s/p enema.   07-19 Patient seen and evaluated at bedside. Downgraded to stepdown unit yesterday. Spirits improved this AM. Denies any significant pain. S/p medel placement by urology. Psych consult pending. No active complaints. BMx1.  07-20 Patient seen and evaluated at bedside. Patient complaining of persistent abdominal discomfort that feels like he has "gas" that began yesterday. Denies pain but is uncomfortable.  Neck IRVING with 5cc/24hr output and leg IRVING with 30cc/24hr. Seen by psych yesterday with recommendations to d/c ativan and zaleplon, start seroquel 25mg PRN, melatonin 3mg qhs. Urology recommended TOV at our discretion and that they are available if any issues. Yesterday, patient declined to walk with PT. No fevers, no difficulty breathing.    ALLERGIES  sulfa drugs (Unknown)    MEDICATIONS  (STANDING):  acetaminophen    Suspension .. 1000 milliGRAM(s) Oral every 6 hours  amLODIPine   Tablet 5 milliGRAM(s) Oral daily  aspirin  chewable 81 milliGRAM(s) Oral daily  BACItracin   Ointment 1 Application(s) Topical every 12 hours  bisacodyl Suppository 10 milliGRAM(s) Rectal daily  carvedilol 6.25 milliGRAM(s) Oral every 12 hours  chlorhexidine 0.12% Liquid 15 milliLiter(s) Oral Mucosa <User Schedule>  dextrose 40% Gel 15 Gram(s) Oral once  dextrose 5%. 1000 milliLiter(s) (50 mL/Hr) IV Continuous <Continuous>  dextrose 5%. 1000 milliLiter(s) (100 mL/Hr) IV Continuous <Continuous>  dextrose 50% Injectable 25 Gram(s) IV Push once  dextrose 50% Injectable 12.5 Gram(s) IV Push once  dextrose 50% Injectable 25 Gram(s) IV Push once  glucagon  Injectable 1 milliGRAM(s) IntraMuscular once  heparin   Injectable 5000 Unit(s) SubCutaneous every 8 hours  insulin lispro (ADMELOG) corrective regimen sliding scale   SubCutaneous every 6 hours  magnesium hydroxide Suspension 30 milliLiter(s) Oral daily  melatonin 3 milliGRAM(s) Oral at bedtime  mineral oil enema 133 milliLiter(s) Rectal once  oxymetazoline 0.05% Nasal Spray 2 Spray(s) Both Nostrils every 12 hours  pantoprazole  Injectable 40 milliGRAM(s) IV Push daily  petrolatum white Ointment 1 Application(s) Topical every 12 hours  polyethylene glycol 3350 17 Gram(s) Oral daily  senna Syrup 10 milliLiter(s) Oral at bedtime  sertraline 100 milliGRAM(s) Oral daily  tamsulosin 0.8 milliGRAM(s) Oral at bedtime    MEDICATIONS  (PRN):  hydrALAZINE Injectable 5 milliGRAM(s) IV Push every 4 hours PRN SBP > 170  oxyCODONE    Solution 5 milliGRAM(s) Oral every 4 hours PRN Moderate Pain (4 - 6)  oxyCODONE    Solution 10 milliGRAM(s) Oral every 4 hours PRN Severe Pain (7 - 10)  QUEtiapine 25 milliGRAM(s) Oral every 6 hours PRN anxiety/agitation/confusion  simethicone 80 milliGRAM(s) Chew every 8 hours PRN Gas        07-19 @ 07:01  -  07-20 @ 07:00  --------------------------------------------------------  IN:  Total IN: 0 mL    OUT:    Bulb (mL): 5 mL    Bulb (mL): 30 mL    Indwelling Catheter - Urethral (mL): 1410 mL    VAC (Vacuum Assisted Closure) System (mL): 0 mL  Total OUT: 1445 mL    Total NET: -1445 mL      Vital Signs Last 24 Hrs  T(C): 36.9 (20 Jul 2021 05:06), Max: 37.1 (19 Jul 2021 17:36)  T(F): 98.5 (20 Jul 2021 05:06), Max: 98.8 (19 Jul 2021 21:58)  HR: 55 (20 Jul 2021 07:03) (55 - 80)  BP: 131/62 (20 Jul 2021 07:03) (116/61 - 186/82)  BP(mean): 82 (19 Jul 2021 08:33) (82 - 82)  RR: 17 (20 Jul 2021 07:03) (17 - 19)  SpO2: 98% (20 Jul 2021 07:03) (95% - 98%)        Physical Exam: NAD  R nasal cavity with nasal trumpet and NGT sutured to septum  Intraoral skin paddle warm, well-perfused, incisions c/d/i with triphasic doppler signal  Neck incisions c/d/i, IRVING with SS fluid- neck IRVING removed  Breathing comfortably on RA  R thigh STSG donor site dressed with tegederm  R fibula donor site wrapped with cast/ACE, IRVING x1 with blood, wound vac holding suction, 30cc/24hr and 0cc/24hr from wound vac

## 2021-07-21 LAB
GLUCOSE BLDC GLUCOMTR-MCNC: 114 MG/DL — HIGH (ref 70–99)
GLUCOSE BLDC GLUCOMTR-MCNC: 131 MG/DL — HIGH (ref 70–99)
GLUCOSE BLDC GLUCOMTR-MCNC: 131 MG/DL — HIGH (ref 70–99)

## 2021-07-21 PROCEDURE — 99233 SBSQ HOSP IP/OBS HIGH 50: CPT

## 2021-07-21 RX ORDER — QUETIAPINE FUMARATE 200 MG/1
25 TABLET, FILM COATED ORAL AT BEDTIME
Refills: 0 | Status: DISCONTINUED | OUTPATIENT
Start: 2021-07-21 | End: 2021-07-21

## 2021-07-21 RX ORDER — QUETIAPINE FUMARATE 200 MG/1
25 TABLET, FILM COATED ORAL EVERY 12 HOURS
Refills: 0 | Status: DISCONTINUED | OUTPATIENT
Start: 2021-07-21 | End: 2021-07-23

## 2021-07-21 RX ADMIN — OXYCODONE HYDROCHLORIDE 5 MILLIGRAM(S): 5 TABLET ORAL at 10:20

## 2021-07-21 RX ADMIN — Medication 1 APPLICATION(S): at 19:08

## 2021-07-21 RX ADMIN — HEPARIN SODIUM 5000 UNIT(S): 5000 INJECTION INTRAVENOUS; SUBCUTANEOUS at 05:18

## 2021-07-21 RX ADMIN — CHLORHEXIDINE GLUCONATE 15 MILLILITER(S): 213 SOLUTION TOPICAL at 21:14

## 2021-07-21 RX ADMIN — Medication 1000 MILLIGRAM(S): at 06:03

## 2021-07-21 RX ADMIN — Medication 1000 MILLIGRAM(S): at 23:26

## 2021-07-21 RX ADMIN — SERTRALINE 100 MILLIGRAM(S): 25 TABLET, FILM COATED ORAL at 13:04

## 2021-07-21 RX ADMIN — Medication 1000 MILLIGRAM(S): at 18:58

## 2021-07-21 RX ADMIN — PANTOPRAZOLE SODIUM 40 MILLIGRAM(S): 20 TABLET, DELAYED RELEASE ORAL at 13:04

## 2021-07-21 RX ADMIN — Medication 1 APPLICATION(S): at 21:13

## 2021-07-21 RX ADMIN — Medication 1 APPLICATION(S): at 09:50

## 2021-07-21 RX ADMIN — OXYCODONE HYDROCHLORIDE 5 MILLIGRAM(S): 5 TABLET ORAL at 16:14

## 2021-07-21 RX ADMIN — Medication 1 APPLICATION(S): at 05:20

## 2021-07-21 RX ADMIN — OXYCODONE HYDROCHLORIDE 5 MILLIGRAM(S): 5 TABLET ORAL at 02:32

## 2021-07-21 RX ADMIN — CHLORHEXIDINE GLUCONATE 15 MILLILITER(S): 213 SOLUTION TOPICAL at 05:18

## 2021-07-21 RX ADMIN — MAGNESIUM HYDROXIDE 30 MILLILITER(S): 400 TABLET, CHEWABLE ORAL at 13:11

## 2021-07-21 RX ADMIN — OXYMETAZOLINE HYDROCHLORIDE 2 SPRAY(S): 0.5 SPRAY NASAL at 05:24

## 2021-07-21 RX ADMIN — QUETIAPINE FUMARATE 25 MILLIGRAM(S): 200 TABLET, FILM COATED ORAL at 13:27

## 2021-07-21 RX ADMIN — Medication 1000 MILLIGRAM(S): at 00:35

## 2021-07-21 RX ADMIN — CHLORHEXIDINE GLUCONATE 15 MILLILITER(S): 213 SOLUTION TOPICAL at 13:03

## 2021-07-21 RX ADMIN — Medication 81 MILLIGRAM(S): at 13:03

## 2021-07-21 RX ADMIN — CARVEDILOL PHOSPHATE 6.25 MILLIGRAM(S): 80 CAPSULE, EXTENDED RELEASE ORAL at 09:20

## 2021-07-21 RX ADMIN — Medication 1000 MILLIGRAM(S): at 14:00

## 2021-07-21 RX ADMIN — CARVEDILOL PHOSPHATE 6.25 MILLIGRAM(S): 80 CAPSULE, EXTENDED RELEASE ORAL at 21:14

## 2021-07-21 RX ADMIN — TAMSULOSIN HYDROCHLORIDE 0.8 MILLIGRAM(S): 0.4 CAPSULE ORAL at 21:14

## 2021-07-21 RX ADMIN — HEPARIN SODIUM 5000 UNIT(S): 5000 INJECTION INTRAVENOUS; SUBCUTANEOUS at 21:14

## 2021-07-21 RX ADMIN — HEPARIN SODIUM 5000 UNIT(S): 5000 INJECTION INTRAVENOUS; SUBCUTANEOUS at 13:03

## 2021-07-21 RX ADMIN — Medication 1000 MILLIGRAM(S): at 13:05

## 2021-07-21 RX ADMIN — OXYMETAZOLINE HYDROCHLORIDE 2 SPRAY(S): 0.5 SPRAY NASAL at 19:08

## 2021-07-21 RX ADMIN — Medication 3 MILLIGRAM(S): at 21:14

## 2021-07-21 RX ADMIN — QUETIAPINE FUMARATE 25 MILLIGRAM(S): 200 TABLET, FILM COATED ORAL at 21:14

## 2021-07-21 RX ADMIN — Medication 1000 MILLIGRAM(S): at 05:18

## 2021-07-21 RX ADMIN — OXYCODONE HYDROCHLORIDE 5 MILLIGRAM(S): 5 TABLET ORAL at 03:23

## 2021-07-21 RX ADMIN — OXYCODONE HYDROCHLORIDE 5 MILLIGRAM(S): 5 TABLET ORAL at 09:21

## 2021-07-21 RX ADMIN — AMLODIPINE BESYLATE 5 MILLIGRAM(S): 2.5 TABLET ORAL at 05:18

## 2021-07-21 NOTE — SWALLOW BEDSIDE ASSESSMENT ADULT - SLP PERTINENT HISTORY OF CURRENT PROBLEM
72 yo M w h/o HTN, HLD, depression, adm w Rmaxilla ameloblastoma now s/p R maxillectomy, dental implants, R fib MVFF, R thigh STSG on 7/14.

## 2021-07-21 NOTE — PROGRESS NOTE BEHAVIORAL HEALTH - RELATEDNESS
This writer facilitated an escalated team meeting with the following individuals present: Dr Vincent Marsh (SLIM), Pedro Osler (Nursing Manager), Sheila Brenner (daughter), Li Gregory (son in law), Bryce Broderick (sister), Momo Snell (brother in law)  Dr Vincent Marsh provided a medical update  Patient is not medically stable for discharge  Patient is currently receiving IV medications to reduce agitation  Discussed plan to transition patient to oral medications, as she cannot have IV medications at SNF  Discussed referral to Hospice team to see if patient is appropriate for the inpatient unit  Discussed referral status  Reviewed [facility]'s requirement of 30 day payment up front, [facility] and [facility] not having a bed  Discussed that [facility] may have a bed but they need family to call to discuss financials  Number was provided  Per family rank choices are as follows 1  [facility]  [facility]- [facility] and 3  [facility]  Family had no other questions or concerns at this time  MSW ADDY SCOTT updated who will speak with hospice liaison to evaluate patient 
Fair

## 2021-07-21 NOTE — SWALLOW BEDSIDE ASSESSMENT ADULT - SPECIFY REASON(S)
Assess candidacy to resume po diet s/pp head/neck surgery
Assess for safest, least restrictive PO diet

## 2021-07-21 NOTE — SWALLOW BEDSIDE ASSESSMENT ADULT - PHARYNGEAL PHASE
Hyolaryngeal excursion palpated during swallow trigger, appears brisk & timely. No throat clear, cough or change in voice with PO trials. Pt noted to make facial grimace and c/o pain with swallowing as well as c/o applesauce being "too thick"
Suspect generally timely swallow trigger.  Laryngeal elevation palpated on swallows.  No overt signs & symptoms of airway protection deficits noted across trials tested.

## 2021-07-21 NOTE — SWALLOW BEDSIDE ASSESSMENT ADULT - COMMENTS
Received awake & alert. Language skills are intact at the conversational level. Son, Norris, present at bedside.

## 2021-07-21 NOTE — SWALLOW BEDSIDE ASSESSMENT ADULT - ORAL PHASE
Within functional limits
REduced labial seal and striping from spoon 2/2 upper lip weakness.  Pt c/o puree being very thick but he appeared to be able to swallow this consistency with minimal difficulty

## 2021-07-21 NOTE — PROGRESS NOTE BEHAVIORAL HEALTH - NSBHFUPINTERVALHXFT_PSY_A_CORE
36.9
Patient seen at bedside. He reports frustration with his current roommate and recent medical care. He endorses anxiety about his current medical state. Denies SI/HI. Denies other mood symptoms. Denies hallucinations.  Reports poor sleep due being waken by the staff and roommate. Talks about his sadness of losing his wife 15 years ago.   Of note, patient's son was visiting the pt when the writer saw the pt.

## 2021-07-21 NOTE — SWALLOW BEDSIDE ASSESSMENT ADULT - NS SPL SWALLOW CLINIC TRIAL FT
Anticipate resolution of symptoms as Pt continues to heal from surgery. This svc will continue to monitor and advance as tolerated or recommend MBS if no improvement in 48 hours.

## 2021-07-21 NOTE — PROGRESS NOTE ADULT - ASSESSMENT
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, dental implants, R fibula MVFF, R thigh STSG 7/14    - Tylenol/oxycodone for pain PRN  - RN flap checks q4h  - Resident flap checks q12h  - TID peridex mouth washes  - TID ambulation with CAM boot  - ASA 81 via NGT daily  - SQH   - Monitor IRVING drain output- plan to DC leg IRVING today   - Uro consult- TOV per primary team- plan to remove medel and TOV today  - psych consult- d/c ativan and zaleplon, start seroquel 25mg PRN, melatonin 3mg qhs  - humidified face tent  - Monitor BP, maintain MAPs   - C/w Hydralazine for BP >180  - add Amlodipine for BP  - Avoid pressors  - 500 cc bolus NS PRN for MAP maintenance   - O2 support as indicated  - continue bolus tube feeds  - advance to goal per nutrition recs   - Bowel regimen while receiving opiates for pain  - PPI, anti-emetics   - Monitor lytes; replace PRN  - Monitor temp, WBC  - s/p Vanco/flagyl/kefzol (7/14-7/19)  - Monitor leg incision   - IRVING to self suction, wound vac to suction  - full weight bearing ambulation  - F/u ambulation w/ PT    Discussed with Dr. 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, dental implants, R fibula MVFF, R thigh STSG 7/14    - Tylenol/oxycodone for pain PRN  - RN flap checks q4h  - Resident flap checks q12h  - TID peridex mouth washes  - TID ambulation with CAM boot  - ASA 81 via NGT daily  - SQH   - Monitor IRVING drain output- plan to DC leg IRVING today   - Uro consult- TOV per primary team- plan to remove medel and TOV today  - psych consult- d/c ativan and zaleplon, start seroquel 25mg PRN, melatonin 3mg qhs  - humidified face tent  - Monitor BP, maintain MAPs   - C/w Hydralazine for BP >180  - add Amlodipine for BP  - Avoid pressors  - 500 cc bolus NS PRN for MAP maintenance   - O2 support as indicated  - continue bolus tube feeds  - advance to goal per nutrition recs   - Bowel regimen while receiving opiates for pain  - PPI, anti-emetics   - Monitor lytes; replace PRN  - Monitor temp, WBC  - s/p Vanco/flagyl/kefzol (7/14-7/19)  - full weight bearing ambulation with CAM boot  - F/u PT recs    Discussed with Dr. Schmid

## 2021-07-21 NOTE — SWALLOW BEDSIDE ASSESSMENT ADULT - SWALLOW EVAL: DIAGNOSIS
Functional oropharyngeal swallow for a full liquid diet with no overt signs of aspiration. Pt c/o globus with puree suggestive of pharyngeal clearance deficits, would need MBS or FEES to visualize the pharyngeal swallow if symptoms do not resolve in 1-2 days.
Pt demonstrated functional oral and pharyngeal swallow at least for thin liquid and pureed consistencies with no overt signs & symptoms of airway protection deficits across trials.

## 2021-07-21 NOTE — PROGRESS NOTE ADULT - SUBJECTIVE AND OBJECTIVE BOX
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 Pt now s/p R maxillectomy, dental implants, R fibula MVFF, R thigh STSG 7/14.    07-15 Patient seen at bedside and discussed with attending. flap checks with strong doppler signal. nMAPs 70s-80s. no events overnight.   07-16 Patient seen at bedside and discussed with attending. no acute events overnight. flap checks with strong doppler. failed TOV, getting straight cath. tolerating diet.   07-17 Patient seen at bedside and discussed with attending. flap check strong doppler signal. MAPs at goal. continues to get straight cath 2/2 unable to void.   07-18 Patient seen at bedside and discussed with attending. flap check strong doppler signal. in poor spirits this AM. continues to get straight cathed. complaining of back pain this AM. BM x1 s/p enema.   07-19 Patient seen and evaluated at bedside. Downgraded to stepdown unit yesterday. Spirits improved this AM. Denies any significant pain. S/p medel placement by urology. Psych consult pending. No active complaints. BMx1.  07-20 Patient seen and evaluated at bedside. Patient complaining of persistent abdominal discomfort that feels like he has "gas" that began yesterday. Denies pain but is uncomfortable.  Neck IRVING with 5cc/24hr output and leg IRVING with 30cc/24hr. Seen by psych yesterday with recommendations to d/c ativan and zaleplon, start seroquel 25mg PRN, melatonin 3mg qhs. Urology recommended TOV at our discretion and that they are available if any issues. Yesterday, patient declined to walk with PT. No fevers, no difficulty breathing.  07-21 Patient seen and examined at bedside. NAEON. Flap check with strong doppler signal. Patient c/o pain yuki-incisional and some improved abdominal pain. Patient had multiple BMs yesterday afternoon/evening. Abd xray pending.     ALLERGIES  sulfa drugs (Unknown)    MEDICATIONS  (STANDING):  acetaminophen    Suspension .. 1000 milliGRAM(s) Oral every 6 hours  amLODIPine   Tablet 5 milliGRAM(s) Oral daily  aspirin  chewable 81 milliGRAM(s) Oral daily  BACItracin   Ointment 1 Application(s) Topical every 12 hours  bisacodyl Suppository 10 milliGRAM(s) Rectal daily  carvedilol 6.25 milliGRAM(s) Oral every 12 hours  chlorhexidine 0.12% Liquid 15 milliLiter(s) Oral Mucosa <User Schedule>  dextrose 40% Gel 15 Gram(s) Oral once  dextrose 5%. 1000 milliLiter(s) (50 mL/Hr) IV Continuous <Continuous>  dextrose 5%. 1000 milliLiter(s) (100 mL/Hr) IV Continuous <Continuous>  dextrose 50% Injectable 25 Gram(s) IV Push once  dextrose 50% Injectable 12.5 Gram(s) IV Push once  dextrose 50% Injectable 25 Gram(s) IV Push once  glucagon  Injectable 1 milliGRAM(s) IntraMuscular once  heparin   Injectable 5000 Unit(s) SubCutaneous every 8 hours  insulin lispro (ADMELOG) corrective regimen sliding scale   SubCutaneous every 6 hours  magnesium hydroxide Suspension 30 milliLiter(s) Oral daily  melatonin 3 milliGRAM(s) Oral at bedtime  oxymetazoline 0.05% Nasal Spray 2 Spray(s) Both Nostrils every 12 hours  pantoprazole  Injectable 40 milliGRAM(s) IV Push daily  petrolatum white Ointment 1 Application(s) Topical every 12 hours  polyethylene glycol 3350 17 Gram(s) Oral daily  QUEtiapine 25 milliGRAM(s) Oral at bedtime  senna Syrup 10 milliLiter(s) Oral at bedtime  sertraline 100 milliGRAM(s) Oral daily  tamsulosin 0.8 milliGRAM(s) Oral at bedtime    MEDICATIONS  (PRN):  hydrALAZINE Injectable 5 milliGRAM(s) IV Push every 4 hours PRN SBP > 170  oxyCODONE    Solution 5 milliGRAM(s) Oral every 4 hours PRN Moderate Pain (4 - 6)  oxyCODONE    Solution 10 milliGRAM(s) Oral every 4 hours PRN Severe Pain (7 - 10)  QUEtiapine 25 milliGRAM(s) Oral every 6 hours PRN anxiety/agitation/confusion  simethicone 80 milliGRAM(s) Chew every 8 hours PRN Gas    Vital Signs Last 24 Hrs  T(C): 36.9 (21 Jul 2021 06:15), Max: 37.5 (20 Jul 2021 09:42)  T(F): 98.4 (21 Jul 2021 06:15), Max: 99.5 (20 Jul 2021 09:42)  HR: 58 (21 Jul 2021 03:52) (52 - 78)  BP: 141/67 (21 Jul 2021 03:52) (125/60 - 172/81)  BP(mean): 97 (21 Jul 2021 03:52) (86 - 117)  RR: 16 (21 Jul 2021 03:52) (16 - 16)  SpO2: 95% (21 Jul 2021 03:52) (95% - 96%)      Physical Exam:   General- NAD  ENT- R nasal cavity with nasal trumpet and NGT sutured to septum  Intraoral skin paddle warm, well-perfused, incisions c/d/i with triphasic doppler signal  Neck incisions c/d/i  Cardiac- regular rate  Pulm- Breathing comfortably on RA  Ext- R thigh STSG donor site dressed with tegaderm, R fibula donor site wrapped with xeroform/telfa/kerlix/ace, IRVING x1 with serosanguinous output        LABS:  cret          I&O's Detail    20 Jul 2021 07:01  -  21 Jul 2021 07:00  --------------------------------------------------------  IN:    Enteral Tube Flush: 775 mL  Total IN: 775 mL    OUT:    Bulb (mL): 25 mL    Indwelling Catheter - Urethral (mL): 1500 mL    Jevity 1.2: 0 mL    VAC (Vacuum Assisted Closure) System (mL): 60 mL  Total OUT: 1585 mL    Total NET: -810 mL

## 2021-07-21 NOTE — SWALLOW BEDSIDE ASSESSMENT ADULT - MUCOSAL QUALITY
Would benefit from oral care
dry, (+) crusting of blood on lips and oral mucosa -- oral care provided prior to po trials

## 2021-07-21 NOTE — PROGRESS NOTE BEHAVIORAL HEALTH - RISK ASSESSMENT
low acute risk: future oriented thinking, motivated for treatment, denies prior history. Patient 's personality traits, past history of a mood disorder and diagnosis of cancer put him at chronic risk

## 2021-07-22 LAB
GLUCOSE BLDC GLUCOMTR-MCNC: 118 MG/DL — HIGH (ref 70–99)
GLUCOSE BLDC GLUCOMTR-MCNC: 120 MG/DL — HIGH (ref 70–99)
GLUCOSE BLDC GLUCOMTR-MCNC: 125 MG/DL — HIGH (ref 70–99)
GLUCOSE BLDC GLUCOMTR-MCNC: 128 MG/DL — HIGH (ref 70–99)

## 2021-07-22 RX ORDER — CALCIUM CARBONATE 500(1250)
1 TABLET ORAL ONCE
Refills: 0 | Status: COMPLETED | OUTPATIENT
Start: 2021-07-22 | End: 2021-07-23

## 2021-07-22 RX ADMIN — OXYCODONE HYDROCHLORIDE 10 MILLIGRAM(S): 5 TABLET ORAL at 06:04

## 2021-07-22 RX ADMIN — OXYMETAZOLINE HYDROCHLORIDE 2 SPRAY(S): 0.5 SPRAY NASAL at 05:37

## 2021-07-22 RX ADMIN — Medication 1000 MILLIGRAM(S): at 13:00

## 2021-07-22 RX ADMIN — Medication 1000 MILLIGRAM(S): at 00:05

## 2021-07-22 RX ADMIN — Medication 1000 MILLIGRAM(S): at 06:04

## 2021-07-22 RX ADMIN — CHLORHEXIDINE GLUCONATE 15 MILLILITER(S): 213 SOLUTION TOPICAL at 16:16

## 2021-07-22 RX ADMIN — TAMSULOSIN HYDROCHLORIDE 0.8 MILLIGRAM(S): 0.4 CAPSULE ORAL at 21:33

## 2021-07-22 RX ADMIN — Medication 1000 MILLIGRAM(S): at 14:15

## 2021-07-22 RX ADMIN — CARVEDILOL PHOSPHATE 6.25 MILLIGRAM(S): 80 CAPSULE, EXTENDED RELEASE ORAL at 21:33

## 2021-07-22 RX ADMIN — QUETIAPINE FUMARATE 25 MILLIGRAM(S): 200 TABLET, FILM COATED ORAL at 05:24

## 2021-07-22 RX ADMIN — CHLORHEXIDINE GLUCONATE 15 MILLILITER(S): 213 SOLUTION TOPICAL at 05:23

## 2021-07-22 RX ADMIN — HEPARIN SODIUM 5000 UNIT(S): 5000 INJECTION INTRAVENOUS; SUBCUTANEOUS at 21:33

## 2021-07-22 RX ADMIN — HEPARIN SODIUM 5000 UNIT(S): 5000 INJECTION INTRAVENOUS; SUBCUTANEOUS at 05:24

## 2021-07-22 RX ADMIN — Medication 1 APPLICATION(S): at 22:47

## 2021-07-22 RX ADMIN — Medication 1 APPLICATION(S): at 20:11

## 2021-07-22 RX ADMIN — PANTOPRAZOLE SODIUM 40 MILLIGRAM(S): 20 TABLET, DELAYED RELEASE ORAL at 13:00

## 2021-07-22 RX ADMIN — OXYCODONE HYDROCHLORIDE 5 MILLIGRAM(S): 5 TABLET ORAL at 23:53

## 2021-07-22 RX ADMIN — CHLORHEXIDINE GLUCONATE 15 MILLILITER(S): 213 SOLUTION TOPICAL at 21:33

## 2021-07-22 RX ADMIN — POLYETHYLENE GLYCOL 3350 17 GRAM(S): 17 POWDER, FOR SOLUTION ORAL at 13:00

## 2021-07-22 RX ADMIN — Medication 1000 MILLIGRAM(S): at 05:23

## 2021-07-22 RX ADMIN — Medication 81 MILLIGRAM(S): at 13:00

## 2021-07-22 RX ADMIN — Medication 1000 MILLIGRAM(S): at 19:40

## 2021-07-22 RX ADMIN — HEPARIN SODIUM 5000 UNIT(S): 5000 INJECTION INTRAVENOUS; SUBCUTANEOUS at 16:22

## 2021-07-22 RX ADMIN — Medication 1 APPLICATION(S): at 13:00

## 2021-07-22 RX ADMIN — CARVEDILOL PHOSPHATE 6.25 MILLIGRAM(S): 80 CAPSULE, EXTENDED RELEASE ORAL at 09:00

## 2021-07-22 RX ADMIN — Medication 3 MILLIGRAM(S): at 21:33

## 2021-07-22 RX ADMIN — OXYMETAZOLINE HYDROCHLORIDE 2 SPRAY(S): 0.5 SPRAY NASAL at 19:40

## 2021-07-22 RX ADMIN — Medication 1 APPLICATION(S): at 05:27

## 2021-07-22 RX ADMIN — OXYCODONE HYDROCHLORIDE 10 MILLIGRAM(S): 5 TABLET ORAL at 05:37

## 2021-07-22 RX ADMIN — QUETIAPINE FUMARATE 25 MILLIGRAM(S): 200 TABLET, FILM COATED ORAL at 19:41

## 2021-07-22 RX ADMIN — AMLODIPINE BESYLATE 5 MILLIGRAM(S): 2.5 TABLET ORAL at 05:24

## 2021-07-22 NOTE — PROGRESS NOTE ADULT - ASSESSMENT
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, dental implants, R fibula MVFF, R thigh STSG 7/14    - Tylenol/oxycodone for pain PRN  - RN flap checks q4h  - Resident flap checks q12h  - TID peridex mouth washes  - TID ambulation with CAM boot  - ASA 81 daily  - SQH   - F/u Uro consult Re- medel replacement after 2 failed TOV/duration of medel if needs to be replaced  - psych consult- start seroquel 25mg q12h standing  - humidified face tent  - Monitor BP, maintain MAPs   - C/w Hydralazine for BP >180  - c/w Amlodipine for BP  - Avoid pressors  - 500 cc bolus NS PRN for MAP maintenance   - O2 support as indicated  - Full liquid diet  - Bowel regimen while receiving opiates for pain- d/c milk of magnesia and dulcolax  - PPI, anti-emetics   - Monitor lytes; replace PRN  - Monitor temp, WBC  - s/p Vanco/flagyl/kefzol (7/14-7/19)  - full weight bearing ambulation with CAM boot  - PT recs- RONEL    Discussed with Dr. Schmid

## 2021-07-22 NOTE — CHART NOTE - NSCHARTNOTEFT_GEN_A_CORE
Admitting Diagnosis:   Patient is a 71y old  Male who presents with a chief complaint of s/p maxillectomy with fibular MVFF (22 Jul 2021 07:47)      PAST MEDICAL & SURGICAL HISTORY:  Cancer of prostate    History of hormone therapy  testosterone blockers  May 2021    Depression, unspecified depression type    Hypertension, unspecified type    Hyperlipidemia, unspecified hyperlipidemia type    H/O gastroesophageal reflux (GERD)    Benign neoplasm    Clostridium difficile diarrhea  JAn 2021    Inguinal hernia, right    History of umbilical hernia repair    History of endoscopic sinus surgery        Current Nutrition Order:  Full Liquids (adv. after being cleared by SLP).   *Added on EnsureEnlivse TID (1050kcal, 60g pro) w/ ENT after breakfast time.     PO Intake: Good (%) [   ]  Fair (50-75%) [   ] Poor (<25%) [x   ]   Pt was having difficulty w/ greek yogurt finding it to be too thick. had 100% of apple juice and requesting more.     GI Issues:   Had multiple BMs 7/21; ordered for miralax and senna  Denies N/V at present  SLP reporting functional oropharyngeal swallow.     Pain:  No complaints of pain at this time.   Ordered for oxyocodone.    Skin Integrity:  surgical incision  Fritz score 19    Labs:         CAPILLARY BLOOD GLUCOSE      POCT Blood Glucose.: 128 mg/dL (22 Jul 2021 11:25)  POCT Blood Glucose.: 120 mg/dL (22 Jul 2021 06:02)  POCT Blood Glucose.: 114 mg/dL (21 Jul 2021 22:22)      Medications:  MEDICATIONS  (STANDING):  acetaminophen    Suspension .. 1000 milliGRAM(s) Oral every 6 hours  amLODIPine   Tablet 5 milliGRAM(s) Oral daily  aspirin  chewable 81 milliGRAM(s) Oral daily  BACItracin   Ointment 1 Application(s) Topical every 12 hours  carvedilol 6.25 milliGRAM(s) Oral every 12 hours  chlorhexidine 0.12% Liquid 15 milliLiter(s) Oral Mucosa <User Schedule>  dextrose 40% Gel 15 Gram(s) Oral once  dextrose 5%. 1000 milliLiter(s) (50 mL/Hr) IV Continuous <Continuous>  dextrose 5%. 1000 milliLiter(s) (100 mL/Hr) IV Continuous <Continuous>  dextrose 50% Injectable 25 Gram(s) IV Push once  dextrose 50% Injectable 12.5 Gram(s) IV Push once  dextrose 50% Injectable 25 Gram(s) IV Push once  glucagon  Injectable 1 milliGRAM(s) IntraMuscular once  heparin   Injectable 5000 Unit(s) SubCutaneous every 8 hours  insulin lispro (ADMELOG) corrective regimen sliding scale   SubCutaneous every 6 hours  melatonin 3 milliGRAM(s) Oral at bedtime  oxymetazoline 0.05% Nasal Spray 2 Spray(s) Both Nostrils every 12 hours  pantoprazole  Injectable 40 milliGRAM(s) IV Push daily  petrolatum white Ointment 1 Application(s) Topical every 12 hours  polyethylene glycol 3350 17 Gram(s) Oral daily  QUEtiapine 25 milliGRAM(s) Oral every 12 hours  senna Syrup 10 milliLiter(s) Oral at bedtime  sertraline 100 milliGRAM(s) Oral daily  tamsulosin 0.8 milliGRAM(s) Oral at bedtime    MEDICATIONS  (PRN):  hydrALAZINE Injectable 5 milliGRAM(s) IV Push every 4 hours PRN SBP > 170  oxyCODONE    Solution 5 milliGRAM(s) Oral every 4 hours PRN Moderate Pain (4 - 6)  oxyCODONE    Solution 10 milliGRAM(s) Oral every 4 hours PRN Severe Pain (7 - 10)  simethicone 80 milliGRAM(s) Chew every 8 hours PRN Gas      Admitted Anthropometrics:  Height: 5'9" Weight: 200lbs, IBW 160lbs+/-10%, %%, BMI 29.5    Weight Change:   Pt noting his weight 196lbs (5/25) now admitting at 199lbs (7/14) noting a +3lb/ 1.5% weight gain over 2 months. Pt reporting good appetite PTA- denied any changes.  Unable to get updated weight as pt was sitting in chair.     Estimated energy needs:   IBW used for calculations as pt >120% of IBW (125%).   Nutrient needs based on [facility] standards of care for maintenance in older adults.   Needs adjusted for post op healing.  1812-2175kcal/day (25-30kcal/kg)  87-109g pro/day (1.2-1.5g pro/kg)  2175-2537ml fluid/day (30-35ml/kg)    Subjective:   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, dental implants, R fibula MVFF, R thigh STSG 7/14    Pt seen in room, sitting in chair eating breakfast. Diet adv to full liquids w/ discontinuation of tube feeds. Pt was frustrated at thickness of Greek yogurt, provided pt w/ thinner fruit yogurt to try. Did well w/ apple juice and was requesting more. Reported he can't have orange juice because of acidity. Discussed w/ pt and team adding on ONS for additional nutrition support while on liquids. ENT adjusted diet order to include Ensure Enlive TID (1050kcal, 60g pro). Will follow pt to monitor adequacy of nutrition intake now that he is no longer on EN for additional support. Notable labs: POCT , 114, 131, 131, BUN 27. RD to follow.     Previous Nutrition Diagnosis:  Increased nutrient needs RT increased demand for protein 2/2 wound healing AEB s/p R maxillectomy, dental implants, R fibula MVFF, R thigh STSG.   Active [ x  ]  Resolved [   ]    If resolved, new PES:     Goal:  Pt to consistently meet % of estimated needs via most appropriate route    Recommendations:  1. Full Liquids w/ EnsureEnlive TID (1050kcal, 60g pro). Further adv. in consistency per SLP recommendations.  2. Bowel regimen per team. Please ensure pt is having 1 BM every 1-2 days. Continue w/ simethicone.   3. Consider further psych evaluation to assist w/ anxiety.   *d/w team. updated order placed by ENT.     Education:   Full Liquid diet. Option of ONS. Thinner liquids and juice.    Risk Level: High [   ] Moderate [ x  ] Low [   ].

## 2021-07-22 NOTE — PROGRESS NOTE ADULT - SUBJECTIVE AND OBJECTIVE BOX
71M PMH HTN, HLD, depression, prostate ca, presented to Dr. Shcmid for R ameloblastoma of the maxilla. Pt now s/p R maxillectomy, dental implants, R fibula MVFF, R thigh STSG 7/14.    07-15 Patient seen at bedside and discussed with attending. flap checks with strong doppler signal. nMAPs 70s-80s. no events overnight.   07-16 Patient seen at bedside and discussed with attending. no acute events overnight. flap checks with strong doppler. failed TOV, getting straight cath. tolerating diet.   07-17 Patient seen at bedside and discussed with attending. flap check strong doppler signal. MAPs at goal. continues to get straight cath 2/2 unable to void.   07-18 Patient seen at bedside and discussed with attending. flap check strong doppler signal. in poor spirits this AM. continues to get straight cathed. complaining of back pain this AM. BM x1 s/p enema.   07-19 Patient seen and evaluated at bedside. Downgraded to stepdown unit yesterday. Spirits improved this AM. Denies any significant pain. S/p medel placement by urology. Psych consult pending. No active complaints. BMx1.  07-20 Patient seen and evaluated at bedside. Patient complaining of persistent abdominal discomfort that feels like he has "gas" that began yesterday. Denies pain but is uncomfortable.  Neck IRVING with 5cc/24hr output and leg IRVING with 30cc/24hr. Seen by psych yesterday with recommendations to d/c ativan and zaleplon, start seroquel 25mg PRN, melatonin 3mg qhs. Urology recommended TOV at our discretion and that they are available if any issues. Yesterday, patient declined to walk with PT. No fevers, no difficulty breathing.  07-21 Patient seen and examined at bedside. NAEON. Flap check with strong doppler signal. Patient c/o pain yuki-incisional and some improved abdominal pain. Patient had multiple BMs yesterday afternoon/evening. Abd xray pending.   07-22 Patient seen and examined at bedside. TOV attempted overnight, but patient did not void, so received 2 bladder scans demonstrating urinary retention requiring straight cath. Patient having multiple bowel movements throughout previous day. Psychiatry was reconsulted yesterday stating seroquel 25mg q12h can be started. Leg IRVING removed yesterday. PT dispo recommendation of RONEL. SLP recommended full liquid diet, so patient was started on diet which he has tolerated thus far. States his abdominal discomfort is improved but not completely resolved.    ALLERGIES  sulfa drugs (Unknown)    MEDICATIONS  (STANDING):  acetaminophen    Suspension .. 1000 milliGRAM(s) Oral every 6 hours  amLODIPine   Tablet 5 milliGRAM(s) Oral daily  aspirin  chewable 81 milliGRAM(s) Oral daily  BACItracin   Ointment 1 Application(s) Topical every 12 hours  carvedilol 6.25 milliGRAM(s) Oral every 12 hours  chlorhexidine 0.12% Liquid 15 milliLiter(s) Oral Mucosa <User Schedule>  dextrose 40% Gel 15 Gram(s) Oral once  dextrose 5%. 1000 milliLiter(s) (50 mL/Hr) IV Continuous <Continuous>  dextrose 5%. 1000 milliLiter(s) (100 mL/Hr) IV Continuous <Continuous>  dextrose 50% Injectable 25 Gram(s) IV Push once  dextrose 50% Injectable 12.5 Gram(s) IV Push once  dextrose 50% Injectable 25 Gram(s) IV Push once  glucagon  Injectable 1 milliGRAM(s) IntraMuscular once  heparin   Injectable 5000 Unit(s) SubCutaneous every 8 hours  insulin lispro (ADMELOG) corrective regimen sliding scale   SubCutaneous every 6 hours  melatonin 3 milliGRAM(s) Oral at bedtime  oxymetazoline 0.05% Nasal Spray 2 Spray(s) Both Nostrils every 12 hours  pantoprazole  Injectable 40 milliGRAM(s) IV Push daily  petrolatum white Ointment 1 Application(s) Topical every 12 hours  polyethylene glycol 3350 17 Gram(s) Oral daily  QUEtiapine 25 milliGRAM(s) Oral every 12 hours  senna Syrup 10 milliLiter(s) Oral at bedtime  sertraline 100 milliGRAM(s) Oral daily  tamsulosin 0.8 milliGRAM(s) Oral at bedtime    MEDICATIONS  (PRN):  hydrALAZINE Injectable 5 milliGRAM(s) IV Push every 4 hours PRN SBP > 170  oxyCODONE    Solution 5 milliGRAM(s) Oral every 4 hours PRN Moderate Pain (4 - 6)  oxyCODONE    Solution 10 milliGRAM(s) Oral every 4 hours PRN Severe Pain (7 - 10)  simethicone 80 milliGRAM(s) Chew every 8 hours PRN Gas    ICU Vital Signs Last 24 Hrs  T(C): 36.4 (22 Jul 2021 06:10), Max: 36.8 (21 Jul 2021 18:08)  T(F): 97.6 (22 Jul 2021 06:10), Max: 98.3 (21 Jul 2021 18:08)  HR: 56 (22 Jul 2021 03:59) (56 - 80)  BP: 145/68 (22 Jul 2021 03:59) (133/63 - 176/78)  BP(mean): 98 (22 Jul 2021 03:59) (90 - 112)  ABP: --  ABP(mean): --  RR: 18 (22 Jul 2021 03:59) (17 - 19)  SpO2: 94% (22 Jul 2021 03:59) (94% - 95%)      Physical Exam:   General- NAD  ENT- R nasal cavity with nasal trumpet and NGT sutured to septum  Intraoral skin paddle warm, well-perfused, incisions c/d/i with triphasic doppler signal  Neck incisions c/d/i  Cardiac- regular rate  Pulm- Breathing comfortably on RA  Ext- R thigh STSG donor site dressed with tegaderm, R fibula donor site wrapped with xeroform/telfa/kerlix/ace

## 2021-07-23 LAB
GLUCOSE BLDC GLUCOMTR-MCNC: 114 MG/DL — HIGH (ref 70–99)
GLUCOSE BLDC GLUCOMTR-MCNC: 116 MG/DL — HIGH (ref 70–99)
GLUCOSE BLDC GLUCOMTR-MCNC: 130 MG/DL — HIGH (ref 70–99)
GLUCOSE BLDC GLUCOMTR-MCNC: 132 MG/DL — HIGH (ref 70–99)
SARS-COV-2 RNA SPEC QL NAA+PROBE: SIGNIFICANT CHANGE UP

## 2021-07-23 RX ORDER — SIMETHICONE 80 MG/1
80 TABLET, CHEWABLE ORAL EVERY 8 HOURS
Refills: 0 | Status: DISCONTINUED | OUTPATIENT
Start: 2021-07-23 | End: 2021-07-26

## 2021-07-23 RX ORDER — OXYCODONE HYDROCHLORIDE 5 MG/1
10 TABLET ORAL EVERY 4 HOURS
Refills: 0 | Status: DISCONTINUED | OUTPATIENT
Start: 2021-07-23 | End: 2021-07-26

## 2021-07-23 RX ORDER — SENNA PLUS 8.6 MG/1
10 TABLET ORAL AT BEDTIME
Refills: 0 | Status: DISCONTINUED | OUTPATIENT
Start: 2021-07-23 | End: 2021-07-26

## 2021-07-23 RX ORDER — ACETAMINOPHEN 500 MG
1000 TABLET ORAL EVERY 6 HOURS
Refills: 0 | Status: DISCONTINUED | OUTPATIENT
Start: 2021-07-23 | End: 2021-07-26

## 2021-07-23 RX ORDER — POLYETHYLENE GLYCOL 3350 17 G/17G
17 POWDER, FOR SOLUTION ORAL DAILY
Refills: 0 | Status: DISCONTINUED | OUTPATIENT
Start: 2021-07-23 | End: 2021-07-26

## 2021-07-23 RX ORDER — SERTRALINE 25 MG/1
100 TABLET, FILM COATED ORAL DAILY
Refills: 0 | Status: DISCONTINUED | OUTPATIENT
Start: 2021-07-23 | End: 2021-07-26

## 2021-07-23 RX ORDER — OXYCODONE HYDROCHLORIDE 5 MG/1
5 TABLET ORAL EVERY 4 HOURS
Refills: 0 | Status: DISCONTINUED | OUTPATIENT
Start: 2021-07-23 | End: 2021-07-26

## 2021-07-23 RX ORDER — AMLODIPINE BESYLATE 2.5 MG/1
5 TABLET ORAL DAILY
Refills: 0 | Status: DISCONTINUED | OUTPATIENT
Start: 2021-07-23 | End: 2021-07-26

## 2021-07-23 RX ORDER — HYDRALAZINE HCL 50 MG
5 TABLET ORAL EVERY 4 HOURS
Refills: 0 | Status: DISCONTINUED | OUTPATIENT
Start: 2021-07-23 | End: 2021-07-26

## 2021-07-23 RX ORDER — LANOLIN ALCOHOL/MO/W.PET/CERES
3 CREAM (GRAM) TOPICAL AT BEDTIME
Refills: 0 | Status: DISCONTINUED | OUTPATIENT
Start: 2021-07-23 | End: 2021-07-26

## 2021-07-23 RX ORDER — CARVEDILOL PHOSPHATE 80 MG/1
6.25 CAPSULE, EXTENDED RELEASE ORAL EVERY 12 HOURS
Refills: 0 | Status: DISCONTINUED | OUTPATIENT
Start: 2021-07-23 | End: 2021-07-26

## 2021-07-23 RX ORDER — TAMSULOSIN HYDROCHLORIDE 0.4 MG/1
0.8 CAPSULE ORAL AT BEDTIME
Refills: 0 | Status: DISCONTINUED | OUTPATIENT
Start: 2021-07-23 | End: 2021-07-26

## 2021-07-23 RX ORDER — QUETIAPINE FUMARATE 200 MG/1
25 TABLET, FILM COATED ORAL EVERY 12 HOURS
Refills: 0 | Status: DISCONTINUED | OUTPATIENT
Start: 2021-07-23 | End: 2021-07-26

## 2021-07-23 RX ORDER — FLUTICASONE PROPIONATE 50 MCG
2 SPRAY, SUSPENSION NASAL
Refills: 0 | Status: DISCONTINUED | OUTPATIENT
Start: 2021-07-23 | End: 2021-07-26

## 2021-07-23 RX ADMIN — Medication 1000 MILLIGRAM(S): at 06:30

## 2021-07-23 RX ADMIN — CHLORHEXIDINE GLUCONATE 15 MILLILITER(S): 213 SOLUTION TOPICAL at 05:28

## 2021-07-23 RX ADMIN — Medication 81 MILLIGRAM(S): at 12:24

## 2021-07-23 RX ADMIN — CHLORHEXIDINE GLUCONATE 15 MILLILITER(S): 213 SOLUTION TOPICAL at 14:29

## 2021-07-23 RX ADMIN — AMLODIPINE BESYLATE 5 MILLIGRAM(S): 2.5 TABLET ORAL at 05:28

## 2021-07-23 RX ADMIN — OXYCODONE HYDROCHLORIDE 5 MILLIGRAM(S): 5 TABLET ORAL at 00:45

## 2021-07-23 RX ADMIN — QUETIAPINE FUMARATE 25 MILLIGRAM(S): 200 TABLET, FILM COATED ORAL at 05:29

## 2021-07-23 RX ADMIN — HEPARIN SODIUM 5000 UNIT(S): 5000 INJECTION INTRAVENOUS; SUBCUTANEOUS at 05:29

## 2021-07-23 RX ADMIN — Medication 2 SPRAY(S): at 21:33

## 2021-07-23 RX ADMIN — CHLORHEXIDINE GLUCONATE 15 MILLILITER(S): 213 SOLUTION TOPICAL at 21:33

## 2021-07-23 RX ADMIN — CARVEDILOL PHOSPHATE 6.25 MILLIGRAM(S): 80 CAPSULE, EXTENDED RELEASE ORAL at 21:33

## 2021-07-23 RX ADMIN — Medication 1 APPLICATION(S): at 17:29

## 2021-07-23 RX ADMIN — CARVEDILOL PHOSPHATE 6.25 MILLIGRAM(S): 80 CAPSULE, EXTENDED RELEASE ORAL at 12:24

## 2021-07-23 RX ADMIN — Medication 1000 MILLIGRAM(S): at 21:48

## 2021-07-23 RX ADMIN — Medication 1000 MILLIGRAM(S): at 22:30

## 2021-07-23 RX ADMIN — Medication 3 MILLIGRAM(S): at 21:34

## 2021-07-23 RX ADMIN — TAMSULOSIN HYDROCHLORIDE 0.8 MILLIGRAM(S): 0.4 CAPSULE ORAL at 21:33

## 2021-07-23 RX ADMIN — Medication 1 APPLICATION(S): at 05:29

## 2021-07-23 RX ADMIN — Medication 1000 MILLIGRAM(S): at 05:30

## 2021-07-23 RX ADMIN — HEPARIN SODIUM 5000 UNIT(S): 5000 INJECTION INTRAVENOUS; SUBCUTANEOUS at 21:34

## 2021-07-23 RX ADMIN — PANTOPRAZOLE SODIUM 40 MILLIGRAM(S): 20 TABLET, DELAYED RELEASE ORAL at 12:24

## 2021-07-23 RX ADMIN — QUETIAPINE FUMARATE 25 MILLIGRAM(S): 200 TABLET, FILM COATED ORAL at 17:29

## 2021-07-23 RX ADMIN — HEPARIN SODIUM 5000 UNIT(S): 5000 INJECTION INTRAVENOUS; SUBCUTANEOUS at 14:22

## 2021-07-23 RX ADMIN — OXYMETAZOLINE HYDROCHLORIDE 2 SPRAY(S): 0.5 SPRAY NASAL at 05:29

## 2021-07-23 NOTE — PROGRESS NOTE ADULT - ASSESSMENT
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, dental implants, R fibula MVFF, R thigh STSG 7/14.    - F/u RONEL placement  - Tylenol/oxycodone for pain PRN  - RN flap checks q4h  - Resident flap checks q12h  - TID peridex mouth washes  - TID ambulation with CAM boot  - ASA 81 daily  - SQH   - psych consult- start seroquel 25mg q12h standing  - humidified face tent  - Monitor BP, maintain MAPs   - C/w Hydralazine for BP >180  - c/w Amlodipine for BP  - Avoid pressors  - 500 cc bolus NS PRN for MAP maintenance   - O2 support as indicated  - Full liquid diet  - Bowel regimen while receiving opiates for pain- d/c milk of magnesia and dulcolax  - PPI, anti-emetics   - Monitor lytes; replace PRN  - Monitor temp, WBC  - s/p Vanco/flagyl/kefzol (7/14-7/19)  - full weight bearing ambulation with CAM boot  - PT recs- RONEL    Discussed with Dr. Schmid

## 2021-07-23 NOTE — PROGRESS NOTE ADULT - SUBJECTIVE AND OBJECTIVE BOX
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 Pt now s/p R maxillectomy, dental implants, R fibula MVFF, R thigh STSG 7/14.    07-15 Patient seen at bedside and discussed with attending. flap checks with strong doppler signal. nMAPs 70s-80s. no events overnight.   07-16 Patient seen at bedside and discussed with attending. no acute events overnight. flap checks with strong doppler. failed TOV, getting straight cath. tolerating diet.   07-17 Patient seen at bedside and discussed with attending. flap check strong doppler signal. MAPs at goal. continues to get straight cath 2/2 unable to void.   07-18 Patient seen at bedside and discussed with attending. flap check strong doppler signal. in poor spirits this AM. continues to get straight cathed. complaining of back pain this AM. BM x1 s/p enema.   07-19 Patient seen and evaluated at bedside. Downgraded to stepdown unit yesterday. Spirits improved this AM. Denies any significant pain. S/p medel placement by urology. Psych consult pending. No active complaints. BMx1.  07-20 Patient seen and evaluated at bedside. Patient complaining of persistent abdominal discomfort that feels like he has "gas" that began yesterday. Denies pain but is uncomfortable.  Neck IRVING with 5cc/24hr output and leg IRVING with 30cc/24hr. Seen by psych yesterday with recommendations to d/c ativan and zaleplon, start seroquel 25mg PRN, melatonin 3mg qhs. Urology recommended TOV at our discretion and that they are available if any issues. Yesterday, patient declined to walk with PT. No fevers, no difficulty breathing.  07-21 Patient seen and examined at bedside. NAEON. Flap check with strong doppler signal. Patient c/o pain yuki-incisional and some improved abdominal pain. Patient had multiple BMs yesterday afternoon/evening. Abd xray pending.   07-22 Patient seen and examined at bedside. TOV attempted overnight, but patient did not void, so received 2 bladder scans demonstrating urinary retention requiring straight cath. Patient having multiple bowel movements throughout previous day. Psychiatry was reconsulted yesterday stating seroquel 25mg q12h can be started. Leg IVRING removed yesterday. PT dispo recommendation of RONEL. SLP recommended full liquid diet, so patient was started on diet which he has tolerated thus far. States his abdominal discomfort is improved but not completely resolved.  07-23 Patient seen and examined at bedside. Transferred to regional floor overnight. Passed TOV in PM yesterday. Abd pain and distention with near complete resolution. Good PO intake so NGT and nasal trumpet were removed. Patient now ambulating with CAM boot and assistance. He is accepted to multiple SARs, and now awaiting patient decision for location.     ALLERGIES  sulfa drugs (Unknown)    MEDICATIONS  (STANDING):  acetaminophen    Suspension .. 1000 milliGRAM(s) Oral every 6 hours  amLODIPine   Tablet 5 milliGRAM(s) Oral daily  aspirin  chewable 81 milliGRAM(s) Oral daily  BACItracin   Ointment 1 Application(s) Topical every 12 hours  carvedilol 6.25 milliGRAM(s) Oral every 12 hours  chlorhexidine 0.12% Liquid 15 milliLiter(s) Oral Mucosa <User Schedule>  dextrose 40% Gel 15 Gram(s) Oral once  dextrose 5%. 1000 milliLiter(s) (50 mL/Hr) IV Continuous <Continuous>  dextrose 5%. 1000 milliLiter(s) (100 mL/Hr) IV Continuous <Continuous>  dextrose 50% Injectable 25 Gram(s) IV Push once  dextrose 50% Injectable 12.5 Gram(s) IV Push once  dextrose 50% Injectable 25 Gram(s) IV Push once  glucagon  Injectable 1 milliGRAM(s) IntraMuscular once  heparin   Injectable 5000 Unit(s) SubCutaneous every 8 hours  insulin lispro (ADMELOG) corrective regimen sliding scale   SubCutaneous every 6 hours  melatonin 3 milliGRAM(s) Oral at bedtime  oxymetazoline 0.05% Nasal Spray 2 Spray(s) Both Nostrils every 12 hours  pantoprazole  Injectable 40 milliGRAM(s) IV Push daily  petrolatum white Ointment 1 Application(s) Topical every 12 hours  polyethylene glycol 3350 17 Gram(s) Oral daily  QUEtiapine 25 milliGRAM(s) Oral every 12 hours  senna Syrup 10 milliLiter(s) Oral at bedtime  sertraline 100 milliGRAM(s) Oral daily  tamsulosin 0.8 milliGRAM(s) Oral at bedtime    MEDICATIONS  (PRN):  hydrALAZINE Injectable 5 milliGRAM(s) IV Push every 4 hours PRN SBP > 170  oxyCODONE    Solution 5 milliGRAM(s) Oral every 4 hours PRN Moderate Pain (4 - 6)  oxyCODONE    Solution 10 milliGRAM(s) Oral every 4 hours PRN Severe Pain (7 - 10)  simethicone 80 milliGRAM(s) Chew every 8 hours PRN Gas    Vital Signs Last 24 Hrs  T(C): 36.8 (23 Jul 2021 05:16), Max: 36.8 (22 Jul 2021 20:30)  T(F): 98.3 (23 Jul 2021 05:16), Max: 98.3 (23 Jul 2021 00:42)  HR: 63 (23 Jul 2021 05:16) (52 - 97)  BP: 146/80 (23 Jul 2021 05:16) (140/73 - 156/74)  BP(mean): 104 (22 Jul 2021 13:46) (96 - 104)  RR: 19 (23 Jul 2021 05:16) (17 - 19)  SpO2: 97% (23 Jul 2021 05:16) (95% - 98%)      Physical Exam:   General- NAD  ENT- R nasal cavity clear, no packing, no erythema  Intraoral skin paddle warm, well-perfused, incisions c/d/i with triphasic doppler signal  Neck incisions c/d/i  Cardiac- regular rate  Pulm- Breathing comfortably on RA  Ext- R thigh STSG donor site dressed with tegaderm, R fibula donor site wrapped with xeroform/telfa/kerlix/ace, incisions clean/dry/intact

## 2021-07-24 LAB
GLUCOSE BLDC GLUCOMTR-MCNC: 115 MG/DL — HIGH (ref 70–99)
GLUCOSE BLDC GLUCOMTR-MCNC: 128 MG/DL — HIGH (ref 70–99)
GLUCOSE BLDC GLUCOMTR-MCNC: 170 MG/DL — HIGH (ref 70–99)
GLUCOSE BLDC GLUCOMTR-MCNC: 175 MG/DL — HIGH (ref 70–99)

## 2021-07-24 RX ORDER — IBUPROFEN 200 MG
600 TABLET ORAL EVERY 8 HOURS
Refills: 0 | Status: DISCONTINUED | OUTPATIENT
Start: 2021-07-24 | End: 2021-07-26

## 2021-07-24 RX ADMIN — TAMSULOSIN HYDROCHLORIDE 0.8 MILLIGRAM(S): 0.4 CAPSULE ORAL at 21:51

## 2021-07-24 RX ADMIN — CHLORHEXIDINE GLUCONATE 15 MILLILITER(S): 213 SOLUTION TOPICAL at 21:50

## 2021-07-24 RX ADMIN — Medication 1 APPLICATION(S): at 16:45

## 2021-07-24 RX ADMIN — Medication 1000 MILLIGRAM(S): at 21:50

## 2021-07-24 RX ADMIN — Medication 2: at 12:17

## 2021-07-24 RX ADMIN — OXYCODONE HYDROCHLORIDE 10 MILLIGRAM(S): 5 TABLET ORAL at 02:30

## 2021-07-24 RX ADMIN — QUETIAPINE FUMARATE 25 MILLIGRAM(S): 200 TABLET, FILM COATED ORAL at 17:07

## 2021-07-24 RX ADMIN — Medication 1000 MILLIGRAM(S): at 06:02

## 2021-07-24 RX ADMIN — CHLORHEXIDINE GLUCONATE 15 MILLILITER(S): 213 SOLUTION TOPICAL at 14:56

## 2021-07-24 RX ADMIN — HEPARIN SODIUM 5000 UNIT(S): 5000 INJECTION INTRAVENOUS; SUBCUTANEOUS at 14:56

## 2021-07-24 RX ADMIN — PANTOPRAZOLE SODIUM 40 MILLIGRAM(S): 20 TABLET, DELAYED RELEASE ORAL at 09:06

## 2021-07-24 RX ADMIN — Medication 1000 MILLIGRAM(S): at 10:40

## 2021-07-24 RX ADMIN — Medication 2 SPRAY(S): at 09:06

## 2021-07-24 RX ADMIN — Medication 1000 MILLIGRAM(S): at 05:10

## 2021-07-24 RX ADMIN — CARVEDILOL PHOSPHATE 6.25 MILLIGRAM(S): 80 CAPSULE, EXTENDED RELEASE ORAL at 21:50

## 2021-07-24 RX ADMIN — Medication 2 SPRAY(S): at 21:49

## 2021-07-24 RX ADMIN — OXYCODONE HYDROCHLORIDE 10 MILLIGRAM(S): 5 TABLET ORAL at 01:33

## 2021-07-24 RX ADMIN — POLYETHYLENE GLYCOL 3350 17 GRAM(S): 17 POWDER, FOR SOLUTION ORAL at 09:18

## 2021-07-24 RX ADMIN — QUETIAPINE FUMARATE 25 MILLIGRAM(S): 200 TABLET, FILM COATED ORAL at 05:11

## 2021-07-24 RX ADMIN — Medication 2: at 23:34

## 2021-07-24 RX ADMIN — HEPARIN SODIUM 5000 UNIT(S): 5000 INJECTION INTRAVENOUS; SUBCUTANEOUS at 05:11

## 2021-07-24 RX ADMIN — HEPARIN SODIUM 5000 UNIT(S): 5000 INJECTION INTRAVENOUS; SUBCUTANEOUS at 21:50

## 2021-07-24 RX ADMIN — AMLODIPINE BESYLATE 5 MILLIGRAM(S): 2.5 TABLET ORAL at 05:11

## 2021-07-24 RX ADMIN — SENNA PLUS 10 MILLILITER(S): 8.6 TABLET ORAL at 21:49

## 2021-07-24 RX ADMIN — Medication 81 MILLIGRAM(S): at 09:19

## 2021-07-24 RX ADMIN — SERTRALINE 100 MILLIGRAM(S): 25 TABLET, FILM COATED ORAL at 09:19

## 2021-07-24 RX ADMIN — Medication 1000 MILLIGRAM(S): at 22:20

## 2021-07-24 RX ADMIN — Medication 1 APPLICATION(S): at 05:11

## 2021-07-24 RX ADMIN — Medication 1000 MILLIGRAM(S): at 09:50

## 2021-07-24 RX ADMIN — CARVEDILOL PHOSPHATE 6.25 MILLIGRAM(S): 80 CAPSULE, EXTENDED RELEASE ORAL at 09:31

## 2021-07-24 RX ADMIN — CHLORHEXIDINE GLUCONATE 15 MILLILITER(S): 213 SOLUTION TOPICAL at 05:11

## 2021-07-24 NOTE — PROGRESS NOTE ADULT - SUBJECTIVE AND OBJECTIVE BOX
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 Pt now s/p R maxillectomy, dental implants, R fibula MVFF, R thigh STSG 7/14.    07-15 Patient seen at bedside and discussed with attending. flap checks with strong doppler signal. nMAPs 70s-80s. no events overnight.   07-16 Patient seen at bedside and discussed with attending. no acute events overnight. flap checks with strong doppler. failed TOV, getting straight cath. tolerating diet.   07-17 Patient seen at bedside and discussed with attending. flap check strong doppler signal. MAPs at goal. continues to get straight cath 2/2 unable to void.   07-18 Patient seen at bedside and discussed with attending. flap check strong doppler signal. in poor spirits this AM. continues to get straight cathed. complaining of back pain this AM. BM x1 s/p enema.   07-19 Patient seen and evaluated at bedside. Downgraded to stepdown unit yesterday. Spirits improved this AM. Denies any significant pain. S/p medel placement by urology. Psych consult pending. No active complaints. BMx1.  07-20 Patient seen and evaluated at bedside. Patient complaining of persistent abdominal discomfort that feels like he has "gas" that began yesterday. Denies pain but is uncomfortable.  Neck IRVING with 5cc/24hr output and leg IRVING with 30cc/24hr. Seen by psych yesterday with recommendations to d/c ativan and zaleplon, start seroquel 25mg PRN, melatonin 3mg qhs. Urology recommended TOV at our discretion and that they are available if any issues. Yesterday, patient declined to walk with PT. No fevers, no difficulty breathing.  07-21 Patient seen and examined at bedside. NAEON. Flap check with strong doppler signal. Patient c/o pain yuki-incisional and some improved abdominal pain. Patient had multiple BMs yesterday afternoon/evening. Abd xray pending.   07-22 Patient seen and examined at bedside. TOV attempted overnight, but patient did not void, so received 2 bladder scans demonstrating urinary retention requiring straight cath. Patient having multiple bowel movements throughout previous day. Psychiatry was reconsulted yesterday stating seroquel 25mg q12h can be started. Leg IRVING removed yesterday. PT dispo recommendation of RONEL. SLP recommended full liquid diet, so patient was started on diet which he has tolerated thus far. States his abdominal discomfort is improved but not completely resolved.  07-23 Patient seen and examined at bedside. Transferred to regional floor overnight. Passed TOV in PM yesterday. Abd pain and distention with near complete resolution. Good PO intake so NGT and nasal trumpet were removed. Patient now ambulating with CAM boot and assistance. He is accepted to multiple SARs, and now awaiting patient decision for location.   07-24: Patient seen and examined at bedside. Voiding. Abd pain and distention with near complete resolution. Tolerating diet.. Patient now ambulating with CAM boot and assistance. He is accepted to multiple SARs, and now awaiting patient decision for location.       ALLERGIES  sulfa drugs (Unknown)    MEDICATIONS  (STANDING):  acetaminophen    Suspension .. 1000 milliGRAM(s) Oral every 6 hours  amLODIPine   Tablet 5 milliGRAM(s) Oral daily  aspirin  chewable 81 milliGRAM(s) Oral daily  BACItracin   Ointment 1 Application(s) Topical every 12 hours  carvedilol 6.25 milliGRAM(s) Oral every 12 hours  chlorhexidine 0.12% Liquid 15 milliLiter(s) Oral Mucosa <User Schedule>  dextrose 40% Gel 15 Gram(s) Oral once  dextrose 5%. 1000 milliLiter(s) (50 mL/Hr) IV Continuous <Continuous>  dextrose 5%. 1000 milliLiter(s) (100 mL/Hr) IV Continuous <Continuous>  dextrose 50% Injectable 25 Gram(s) IV Push once  dextrose 50% Injectable 12.5 Gram(s) IV Push once  dextrose 50% Injectable 25 Gram(s) IV Push once  glucagon  Injectable 1 milliGRAM(s) IntraMuscular once  heparin   Injectable 5000 Unit(s) SubCutaneous every 8 hours  insulin lispro (ADMELOG) corrective regimen sliding scale   SubCutaneous every 6 hours  melatonin 3 milliGRAM(s) Oral at bedtime  oxymetazoline 0.05% Nasal Spray 2 Spray(s) Both Nostrils every 12 hours  pantoprazole  Injectable 40 milliGRAM(s) IV Push daily  petrolatum white Ointment 1 Application(s) Topical every 12 hours  polyethylene glycol 3350 17 Gram(s) Oral daily  QUEtiapine 25 milliGRAM(s) Oral every 12 hours  senna Syrup 10 milliLiter(s) Oral at bedtime  sertraline 100 milliGRAM(s) Oral daily  tamsulosin 0.8 milliGRAM(s) Oral at bedtime    MEDICATIONS  (PRN):  hydrALAZINE Injectable 5 milliGRAM(s) IV Push every 4 hours PRN SBP > 170  oxyCODONE    Solution 5 milliGRAM(s) Oral every 4 hours PRN Moderate Pain (4 - 6)  oxyCODONE    Solution 10 milliGRAM(s) Oral every 4 hours PRN Severe Pain (7 - 10)  simethicone 80 milliGRAM(s) Chew every 8 hours PRN Gas    Vital Signs Last 24 Hrs  T(C): 36.8 (24 Jul 2021 08:59), Max: 36.8 (23 Jul 2021 20:20)  T(F): 98.2 (24 Jul 2021 08:59), Max: 98.3 (23 Jul 2021 20:20)  HR: 60 (24 Jul 2021 08:59) (60 - 95)  BP: 117/70 (24 Jul 2021 08:59) (116/73 - 154/82)  BP(mean): 95 (24 Jul 2021 05:00) (95 - 95)  RR: 17 (24 Jul 2021 08:59) (17 - 18)  SpO2: 97% (24 Jul 2021 08:59) (97% - 98%)    Physical Exam:   General- NAD  ENT- R nasal cavity clear, no packing, no erythema  Intraoral skin paddle warm, well-perfused, incisions c/d/i with triphasic doppler signal  Neck incisions c/d/i  Cardiac- regular rate  Pulm- Breathing comfortably on RA  Ext- R thigh STSG donor site dressed with tegaderm, R fibula donor site wrapped with xeroform/telfa/kerlix/ace, incisions clean/dry/intact

## 2021-07-25 LAB
GLUCOSE BLDC GLUCOMTR-MCNC: 107 MG/DL — HIGH (ref 70–99)
GLUCOSE BLDC GLUCOMTR-MCNC: 129 MG/DL — HIGH (ref 70–99)
GLUCOSE BLDC GLUCOMTR-MCNC: 162 MG/DL — HIGH (ref 70–99)
SARS-COV-2 RNA SPEC QL NAA+PROBE: SIGNIFICANT CHANGE UP

## 2021-07-25 RX ADMIN — HEPARIN SODIUM 5000 UNIT(S): 5000 INJECTION INTRAVENOUS; SUBCUTANEOUS at 14:22

## 2021-07-25 RX ADMIN — Medication 1000 MILLIGRAM(S): at 10:38

## 2021-07-25 RX ADMIN — TAMSULOSIN HYDROCHLORIDE 0.8 MILLIGRAM(S): 0.4 CAPSULE ORAL at 21:59

## 2021-07-25 RX ADMIN — CHLORHEXIDINE GLUCONATE 15 MILLILITER(S): 213 SOLUTION TOPICAL at 14:22

## 2021-07-25 RX ADMIN — Medication 81 MILLIGRAM(S): at 14:22

## 2021-07-25 RX ADMIN — Medication 2 SPRAY(S): at 21:59

## 2021-07-25 RX ADMIN — Medication 1000 MILLIGRAM(S): at 10:30

## 2021-07-25 RX ADMIN — Medication 1 APPLICATION(S): at 17:34

## 2021-07-25 RX ADMIN — CARVEDILOL PHOSPHATE 6.25 MILLIGRAM(S): 80 CAPSULE, EXTENDED RELEASE ORAL at 21:59

## 2021-07-25 RX ADMIN — HEPARIN SODIUM 5000 UNIT(S): 5000 INJECTION INTRAVENOUS; SUBCUTANEOUS at 05:41

## 2021-07-25 RX ADMIN — CARVEDILOL PHOSPHATE 6.25 MILLIGRAM(S): 80 CAPSULE, EXTENDED RELEASE ORAL at 10:31

## 2021-07-25 RX ADMIN — Medication 1000 MILLIGRAM(S): at 06:10

## 2021-07-25 RX ADMIN — Medication 1 APPLICATION(S): at 05:41

## 2021-07-25 RX ADMIN — Medication 2 SPRAY(S): at 10:31

## 2021-07-25 RX ADMIN — PANTOPRAZOLE SODIUM 40 MILLIGRAM(S): 20 TABLET, DELAYED RELEASE ORAL at 11:51

## 2021-07-25 RX ADMIN — SERTRALINE 100 MILLIGRAM(S): 25 TABLET, FILM COATED ORAL at 14:24

## 2021-07-25 RX ADMIN — Medication 1000 MILLIGRAM(S): at 05:40

## 2021-07-25 RX ADMIN — AMLODIPINE BESYLATE 5 MILLIGRAM(S): 2.5 TABLET ORAL at 05:40

## 2021-07-25 RX ADMIN — CHLORHEXIDINE GLUCONATE 15 MILLILITER(S): 213 SOLUTION TOPICAL at 05:41

## 2021-07-25 RX ADMIN — Medication 1000 MILLIGRAM(S): at 23:41

## 2021-07-25 RX ADMIN — CHLORHEXIDINE GLUCONATE 15 MILLILITER(S): 213 SOLUTION TOPICAL at 21:59

## 2021-07-25 NOTE — PROGRESS NOTE ADULT - SUBJECTIVE AND OBJECTIVE BOX
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 Pt now s/p R maxillectomy, dental implants, R fibula MVFF, R thigh STSG 7/14.    07-15 Patient seen at bedside and discussed with attending. flap checks with strong doppler signal. nMAPs 70s-80s. no events overnight.   07-16 Patient seen at bedside and discussed with attending. no acute events overnight. flap checks with strong doppler. failed TOV, getting straight cath. tolerating diet.   07-17 Patient seen at bedside and discussed with attending. flap check strong doppler signal. MAPs at goal. continues to get straight cath 2/2 unable to void.   07-18 Patient seen at bedside and discussed with attending. flap check strong doppler signal. in poor spirits this AM. continues to get straight cathed. complaining of back pain this AM. BM x1 s/p enema.   07-19 Patient seen and evaluated at bedside. Downgraded to stepdown unit yesterday. Spirits improved this AM. Denies any significant pain. S/p medel placement by urology. Psych consult pending. No active complaints. BMx1.  07-20 Patient seen and evaluated at bedside. Patient complaining of persistent abdominal discomfort that feels like he has "gas" that began yesterday. Denies pain but is uncomfortable.  Neck IRVING with 5cc/24hr output and leg IRVING with 30cc/24hr. Seen by psych yesterday with recommendations to d/c ativan and zaleplon, start seroquel 25mg PRN, melatonin 3mg qhs. Urology recommended TOV at our discretion and that they are available if any issues. Yesterday, patient declined to walk with PT. No fevers, no difficulty breathing.  07-21 Patient seen and examined at bedside. NAEON. Flap check with strong doppler signal. Patient c/o pain yuki-incisional and some improved abdominal pain. Patient had multiple BMs yesterday afternoon/evening. Abd xray pending.   07-22 Patient seen and examined at bedside. TOV attempted overnight, but patient did not void, so received 2 bladder scans demonstrating urinary retention requiring straight cath. Patient having multiple bowel movements throughout previous day. Psychiatry was reconsulted yesterday stating seroquel 25mg q12h can be started. Leg IRVING removed yesterday. PT dispo recommendation of RONEL. SLP recommended full liquid diet, so patient was started on diet which he has tolerated thus far. States his abdominal discomfort is improved but not completely resolved.  07-23 Patient seen and examined at bedside. Transferred to regional floor overnight. Passed TOV in PM yesterday. Abd pain and distention with near complete resolution. Good PO intake so NGT and nasal trumpet were removed. Patient now ambulating with CAM boot and assistance. He is accepted to multiple SARs, and now awaiting patient decision for location.   07-24: Patient seen and examined at bedside. Voiding. Abd pain and distention with near complete resolution. Tolerating diet.. Patient now ambulating with CAM boot and assistance. He is accepted to multiple SARs, and now awaiting patient decision for location.   07-25: Patient seen and examined at bedside. Voiding. Abdominal pain resolved. Tolerating mechanical soft diet. Ambulating with CAM boot. Pending [facility] auth.       ALLERGIES  sulfa drugs (Unknown)    MEDICATIONS  (STANDING):  acetaminophen    Suspension .. 1000 milliGRAM(s) Oral every 6 hours  amLODIPine   Tablet 5 milliGRAM(s) Oral daily  aspirin  chewable 81 milliGRAM(s) Oral daily  BACItracin   Ointment 1 Application(s) Topical every 12 hours  carvedilol 6.25 milliGRAM(s) Oral every 12 hours  chlorhexidine 0.12% Liquid 15 milliLiter(s) Oral Mucosa <User Schedule>  dextrose 40% Gel 15 Gram(s) Oral once  dextrose 5%. 1000 milliLiter(s) (50 mL/Hr) IV Continuous <Continuous>  dextrose 5%. 1000 milliLiter(s) (100 mL/Hr) IV Continuous <Continuous>  dextrose 50% Injectable 25 Gram(s) IV Push once  dextrose 50% Injectable 12.5 Gram(s) IV Push once  dextrose 50% Injectable 25 Gram(s) IV Push once  glucagon  Injectable 1 milliGRAM(s) IntraMuscular once  heparin   Injectable 5000 Unit(s) SubCutaneous every 8 hours  insulin lispro (ADMELOG) corrective regimen sliding scale   SubCutaneous every 6 hours  melatonin 3 milliGRAM(s) Oral at bedtime  oxymetazoline 0.05% Nasal Spray 2 Spray(s) Both Nostrils every 12 hours  pantoprazole  Injectable 40 milliGRAM(s) IV Push daily  petrolatum white Ointment 1 Application(s) Topical every 12 hours  polyethylene glycol 3350 17 Gram(s) Oral daily  QUEtiapine 25 milliGRAM(s) Oral every 12 hours  senna Syrup 10 milliLiter(s) Oral at bedtime  sertraline 100 milliGRAM(s) Oral daily  tamsulosin 0.8 milliGRAM(s) Oral at bedtime    MEDICATIONS  (PRN):  hydrALAZINE Injectable 5 milliGRAM(s) IV Push every 4 hours PRN SBP > 170  oxyCODONE    Solution 5 milliGRAM(s) Oral every 4 hours PRN Moderate Pain (4 - 6)  oxyCODONE    Solution 10 milliGRAM(s) Oral every 4 hours PRN Severe Pain (7 - 10)  simethicone 80 milliGRAM(s) Chew every 8 hours PRN Gas    Vital Signs Last 24 Hrs  T(C): 36.6 (25 Jul 2021 07:59), Max: 36.8 (24 Jul 2021 08:59)  T(F): 97.9 (25 Jul 2021 07:59), Max: 98.2 (24 Jul 2021 08:59)  HR: 61 (25 Jul 2021 07:59) (55 - 74)  BP: 137/77 (25 Jul 2021 07:59) (117/70 - 157/76)  BP(mean): 103 (25 Jul 2021 05:00) (103 - 103)  RR: 17 (25 Jul 2021 07:59) (17 - 18)  SpO2: 97% (25 Jul 2021 07:59) (96% - 99%)    Physical Exam:   General- NAD  ENT- R nasal cavity clear, no packing, no erythema  Intraoral skin paddle warm, well-perfused, incisions c/d/i with triphasic doppler signal  Neck incisions c/d/i  Cardiac- regular rate  Pulm- Breathing comfortably on RA  Ext- R thigh STSG donor site dressed with tegaderm, R fibula donor site wrapped with xeroform/telfa/kerlix/ace, incisions clean/dry/intact

## 2021-07-26 ENCOUNTER — TRANSCRIPTION ENCOUNTER (OUTPATIENT)
Age: 71
End: 2021-07-26

## 2021-07-26 VITALS
RESPIRATION RATE: 17 BRPM | SYSTOLIC BLOOD PRESSURE: 111 MMHG | OXYGEN SATURATION: 98 % | DIASTOLIC BLOOD PRESSURE: 68 MMHG | HEART RATE: 79 BPM | TEMPERATURE: 98 F

## 2021-07-26 PROCEDURE — 84100 ASSAY OF PHOSPHORUS: CPT

## 2021-07-26 PROCEDURE — 86803 HEPATITIS C AB TEST: CPT

## 2021-07-26 PROCEDURE — P9016: CPT

## 2021-07-26 PROCEDURE — C1713: CPT

## 2021-07-26 PROCEDURE — 83036 HEMOGLOBIN GLYCOSYLATED A1C: CPT

## 2021-07-26 PROCEDURE — 86769 SARS-COV-2 COVID-19 ANTIBODY: CPT

## 2021-07-26 PROCEDURE — 82040 ASSAY OF SERUM ALBUMIN: CPT

## 2021-07-26 PROCEDURE — 88305 TISSUE EXAM BY PATHOLOGIST: CPT

## 2021-07-26 PROCEDURE — 88331 PATH CONSLTJ SURG 1 BLK 1SPC: CPT

## 2021-07-26 PROCEDURE — 71045 X-RAY EXAM CHEST 1 VIEW: CPT

## 2021-07-26 PROCEDURE — 80048 BASIC METABOLIC PNL TOTAL CA: CPT

## 2021-07-26 PROCEDURE — 85025 COMPLETE CBC W/AUTO DIFF WBC: CPT

## 2021-07-26 PROCEDURE — 74018 RADEX ABDOMEN 1 VIEW: CPT

## 2021-07-26 PROCEDURE — 97116 GAIT TRAINING THERAPY: CPT

## 2021-07-26 PROCEDURE — U0005: CPT

## 2021-07-26 PROCEDURE — 83735 ASSAY OF MAGNESIUM: CPT

## 2021-07-26 PROCEDURE — 84295 ASSAY OF SERUM SODIUM: CPT

## 2021-07-26 PROCEDURE — 86923 COMPATIBILITY TEST ELECTRIC: CPT

## 2021-07-26 PROCEDURE — 94640 AIRWAY INHALATION TREATMENT: CPT

## 2021-07-26 PROCEDURE — 85027 COMPLETE CBC AUTOMATED: CPT

## 2021-07-26 PROCEDURE — 86900 BLOOD TYPING SEROLOGIC ABO: CPT

## 2021-07-26 PROCEDURE — C9353: CPT

## 2021-07-26 PROCEDURE — 88307 TISSUE EXAM BY PATHOLOGIST: CPT

## 2021-07-26 PROCEDURE — 36415 COLL VENOUS BLD VENIPUNCTURE: CPT

## 2021-07-26 PROCEDURE — U0003: CPT

## 2021-07-26 PROCEDURE — 82330 ASSAY OF CALCIUM: CPT

## 2021-07-26 PROCEDURE — 97162 PT EVAL MOD COMPLEX 30 MIN: CPT

## 2021-07-26 PROCEDURE — 85018 HEMOGLOBIN: CPT

## 2021-07-26 PROCEDURE — 88300 SURGICAL PATH GROSS: CPT

## 2021-07-26 PROCEDURE — C1889: CPT

## 2021-07-26 PROCEDURE — 82962 GLUCOSE BLOOD TEST: CPT

## 2021-07-26 PROCEDURE — C1762: CPT

## 2021-07-26 PROCEDURE — 86901 BLOOD TYPING SEROLOGIC RH(D): CPT

## 2021-07-26 PROCEDURE — 92526 ORAL FUNCTION THERAPY: CPT

## 2021-07-26 PROCEDURE — 86850 RBC ANTIBODY SCREEN: CPT

## 2021-07-26 PROCEDURE — 92610 EVALUATE SWALLOWING FUNCTION: CPT

## 2021-07-26 PROCEDURE — C9399: CPT

## 2021-07-26 PROCEDURE — 83605 ASSAY OF LACTIC ACID: CPT

## 2021-07-26 PROCEDURE — 84132 ASSAY OF SERUM POTASSIUM: CPT

## 2021-07-26 PROCEDURE — 97530 THERAPEUTIC ACTIVITIES: CPT

## 2021-07-26 PROCEDURE — 81003 URINALYSIS AUTO W/O SCOPE: CPT

## 2021-07-26 PROCEDURE — 36430 TRANSFUSION BLD/BLD COMPNT: CPT

## 2021-07-26 RX ORDER — ASPIRIN/CALCIUM CARB/MAGNESIUM 324 MG
1 TABLET ORAL
Qty: 30 | Refills: 0
Start: 2021-07-26 | End: 2021-08-24

## 2021-07-26 RX ORDER — QUETIAPINE FUMARATE 200 MG/1
1 TABLET, FILM COATED ORAL
Qty: 28 | Refills: 0
Start: 2021-07-26 | End: 2021-08-08

## 2021-07-26 RX ORDER — QUETIAPINE FUMARATE 200 MG/1
1 TABLET, FILM COATED ORAL
Qty: 14 | Refills: 0
Start: 2021-07-26 | End: 2021-08-01

## 2021-07-26 RX ORDER — LANOLIN/MINERAL OIL
1 LOTION (ML) TOPICAL
Qty: 454 | Refills: 0
Start: 2021-07-26 | End: 2021-08-01

## 2021-07-26 RX ORDER — CHLORHEXIDINE GLUCONATE 213 G/1000ML
15 SOLUTION TOPICAL
Qty: 315 | Refills: 0
Start: 2021-07-26 | End: 2021-08-01

## 2021-07-26 RX ADMIN — Medication 1 APPLICATION(S): at 05:56

## 2021-07-26 RX ADMIN — SERTRALINE 100 MILLIGRAM(S): 25 TABLET, FILM COATED ORAL at 12:25

## 2021-07-26 RX ADMIN — Medication 1000 MILLIGRAM(S): at 00:11

## 2021-07-26 RX ADMIN — Medication 81 MILLIGRAM(S): at 12:25

## 2021-07-26 RX ADMIN — CHLORHEXIDINE GLUCONATE 15 MILLILITER(S): 213 SOLUTION TOPICAL at 14:17

## 2021-07-26 RX ADMIN — Medication 1000 MILLIGRAM(S): at 10:43

## 2021-07-26 RX ADMIN — Medication 2 SPRAY(S): at 10:41

## 2021-07-26 RX ADMIN — CHLORHEXIDINE GLUCONATE 15 MILLILITER(S): 213 SOLUTION TOPICAL at 05:55

## 2021-07-26 RX ADMIN — PANTOPRAZOLE SODIUM 40 MILLIGRAM(S): 20 TABLET, DELAYED RELEASE ORAL at 12:25

## 2021-07-26 RX ADMIN — AMLODIPINE BESYLATE 5 MILLIGRAM(S): 2.5 TABLET ORAL at 05:55

## 2021-07-26 RX ADMIN — CARVEDILOL PHOSPHATE 6.25 MILLIGRAM(S): 80 CAPSULE, EXTENDED RELEASE ORAL at 10:41

## 2021-07-26 RX ADMIN — Medication 1000 MILLIGRAM(S): at 10:42

## 2021-07-26 NOTE — PROGRESS NOTE ADULT - REASON FOR ADMISSION
s/p maxillectomy with fibular MVFF

## 2021-07-26 NOTE — DISCHARGE NOTE PROVIDER - CARE PROVIDER_API CALL
Lauren Dao)  Otolaryngology  [address] [place] [address], [address] [place] [address]  Phone: [phone]  Fax: [phone]  Follow Up Time:     Martin Schmid (DDS; MD)  OralMaxillofacial Surgery  [address] [address]  Phone: [phone]  Fax: [phone]  Follow Up Time:

## 2021-07-26 NOTE — PROGRESS NOTE ADULT - NSICDXPILOT_GEN_ALL_CORE
[place]

## 2021-07-26 NOTE — DISCHARGE NOTE PROVIDER - NSDCCPCAREPLAN_GEN_ALL_CORE_FT
PRINCIPAL DISCHARGE DIAGNOSIS  Diagnosis: Ameloblastoma of jaw  Assessment and Plan of Treatment:

## 2021-07-26 NOTE — DISCHARGE NOTE NURSING/CASE MANAGEMENT/SOCIAL WORK - PATIENT PORTAL LINK FT
You can access the FollowMyHealth Patient Portal offered by [facility] by registering at the following website: [email or web address]. By joining [facility]’s FollowMyHealth portal, you will also be able to view your health information using other applications (apps) compatible with our system.

## 2021-07-26 NOTE — DISCHARGE NOTE PROVIDER - NSDCMRMEDTOKEN_GEN_ALL_CORE_FT
Absorbase topical ointment: Apply topically to affected area 2 times a day   amLODIPine 2.5 mg oral tablet: 1 tab(s) orally once a day  aspirin 81 mg oral delayed release tablet: 1 tab(s) orally once a day   atorvastatin 10 mg oral tablet: 1 tab(s) orally once a day  CAM boot: Size 9.5 R CAM boot  carvedilol 10 mg oral capsule, extended release: orally 2 times a day  fluticasone 50 mcg/inh inhalation powder: 1 puff(s) inhaled 2 times a day, As Needed  omeprazole 20 mg oral delayed release capsule: 1 cap(s) orally once a day  Peridex 0.12% mucous membrane liquid: 15 milliliter(s) orally 3 times a day   sertraline 100 mg oral tablet: 1 tab(s) orally once a day  tamsulosin 0.4 mg oral capsule: 2 caplet(s) orally once a day   Absorbase topical ointment: Apply topically to affected area 2 times a day   amLODIPine 2.5 mg oral tablet: 1 tab(s) orally once a day  aspirin 81 mg oral delayed release tablet: 1 tab(s) orally once a day   atorvastatin 10 mg oral tablet: 1 tab(s) orally once a day  carvedilol 10 mg oral capsule, extended release: orally 2 times a day  fluticasone 50 mcg/inh inhalation powder: 1 puff(s) inhaled 2 times a day, As Needed  omeprazole 20 mg oral delayed release capsule: 1 cap(s) orally once a day  Peridex 0.12% mucous membrane liquid: 15 milliliter(s) orally 3 times a day   SEROquel 25 mg oral tablet: 1 tab(s) orally every 12 hours   sertraline 100 mg oral tablet: 1 tab(s) orally once a day  tamsulosin 0.4 mg oral capsule: 2 caplet(s) orally once a day

## 2021-07-26 NOTE — PROGRESS NOTE ADULT - SUBJECTIVE AND OBJECTIVE BOX
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 Pt now s/p R maxillectomy, dental implants, R fibula MVFF, R thigh STSG 7/14.    07-15 Patient seen at bedside and discussed with attending. flap checks with strong doppler signal. nMAPs 70s-80s. no events overnight.   07-16 Patient seen at bedside and discussed with attending. no acute events overnight. flap checks with strong doppler. failed TOV, getting straight cath. tolerating diet.   07-17 Patient seen at bedside and discussed with attending. flap check strong doppler signal. MAPs at goal. continues to get straight cath 2/2 unable to void.   07-18 Patient seen at bedside and discussed with attending. flap check strong doppler signal. in poor spirits this AM. continues to get straight cathed. complaining of back pain this AM. BM x1 s/p enema.   07-19 Patient seen and evaluated at bedside. Downgraded to stepdown unit yesterday. Spirits improved this AM. Denies any significant pain. S/p medel placement by urology. Psych consult pending. No active complaints. BMx1.  07-20 Patient seen and evaluated at bedside. Patient complaining of persistent abdominal discomfort that feels like he has "gas" that began yesterday. Denies pain but is uncomfortable.  Neck IRVING with 5cc/24hr output and leg IRVING with 30cc/24hr. Seen by psych yesterday with recommendations to d/c ativan and zaleplon, start seroquel 25mg PRN, melatonin 3mg qhs. Urology recommended TOV at our discretion and that they are available if any issues. Yesterday, patient declined to walk with PT. No fevers, no difficulty breathing.  07-21 Patient seen and examined at bedside. NAEON. Flap check with strong doppler signal. Patient c/o pain yuki-incisional and some improved abdominal pain. Patient had multiple BMs yesterday afternoon/evening. Abd xray pending.   07-22 Patient seen and examined at bedside. TOV attempted overnight, but patient did not void, so received 2 bladder scans demonstrating urinary retention requiring straight cath. Patient having multiple bowel movements throughout previous day. Psychiatry was reconsulted yesterday stating seroquel 25mg q12h can be started. Leg IRVING removed yesterday. PT dispo recommendation of RONEL. SLP recommended full liquid diet, so patient was started on diet which he has tolerated thus far. States his abdominal discomfort is improved but not completely resolved.  07-23 Patient seen and examined at bedside. Transferred to regional floor overnight. Passed TOV in PM yesterday. Abd pain and distention with near complete resolution. Good PO intake so NGT and nasal trumpet were removed. Patient now ambulating with CAM boot and assistance. He is accepted to multiple SARs, and now awaiting patient decision for location.   07-24: Patient seen and examined at bedside. Voiding. Abd pain and distention with near complete resolution. Tolerating diet.. Patient now ambulating with CAM boot and assistance. He is accepted to multiple SARs, and now awaiting patient decision for location.   07-25: Patient seen and examined at bedside. Voiding. Abdominal pain resolved. Tolerating mechanical soft diet. Ambulating with CAM boot. Pending RONEL auth.   07-26 Patient seen at bedside and discussed with attending. ambulating, voiding and having BMs. tolerating soft diet. small area of dehiscence noted posteromedially. Pending RONEL.     ALLERGIES  sulfa drugs (Unknown)    MEDICATIONS  (STANDING):  acetaminophen    Suspension .. 1000 milliGRAM(s) Oral every 6 hours  amLODIPine   Tablet 5 milliGRAM(s) Oral daily  aspirin  chewable 81 milliGRAM(s) Oral daily  carvedilol 6.25 milliGRAM(s) Oral every 12 hours  chlorhexidine 0.12% Liquid 15 milliLiter(s) Oral Mucosa <User Schedule>  dextrose 40% Gel 15 Gram(s) Oral once  dextrose 5%. 1000 milliLiter(s) (50 mL/Hr) IV Continuous <Continuous>  dextrose 5%. 1000 milliLiter(s) (100 mL/Hr) IV Continuous <Continuous>  dextrose 50% Injectable 25 Gram(s) IV Push once  dextrose 50% Injectable 12.5 Gram(s) IV Push once  dextrose 50% Injectable 25 Gram(s) IV Push once  fluticasone propionate 50 MICROgram(s)/spray Nasal Spray 2 Spray(s) Both Nostrils two times a day  glucagon  Injectable 1 milliGRAM(s) IntraMuscular once  heparin   Injectable 5000 Unit(s) SubCutaneous every 8 hours  insulin lispro (ADMELOG) corrective regimen sliding scale   SubCutaneous every 6 hours  melatonin 3 milliGRAM(s) Oral at bedtime  pantoprazole  Injectable 40 milliGRAM(s) IV Push daily  petrolatum white Ointment 1 Application(s) Topical every 12 hours  polyethylene glycol 3350 17 Gram(s) Oral daily  QUEtiapine 25 milliGRAM(s) Oral every 12 hours  senna Syrup 10 milliLiter(s) Oral at bedtime  sertraline 100 milliGRAM(s) Oral daily  tamsulosin 0.8 milliGRAM(s) Oral at bedtime    MEDICATIONS  (PRN):  hydrALAZINE Injectable 5 milliGRAM(s) IV Push every 4 hours PRN SBP > 170  ibuprofen  Tablet. 600 milliGRAM(s) Oral every 8 hours PRN Mild Pain (1 - 3)  oxyCODONE    Solution 5 milliGRAM(s) Oral every 4 hours PRN Moderate Pain (4 - 6)  oxyCODONE    Solution 10 milliGRAM(s) Oral every 4 hours PRN Severe Pain (7 - 10)  simethicone 80 milliGRAM(s) Chew every 8 hours PRN Gas        LABS                    INs & OUTs  Drains:       VITAL SIGNS:  ICU Vital Signs Last 24 Hrs  T(C): 36.6 (26 Jul 2021 04:45), Max: 36.9 (25 Jul 2021 13:11)  T(F): 97.9 (26 Jul 2021 04:45), Max: 98.4 (25 Jul 2021 13:11)  HR: 61 (26 Jul 2021 04:45) (61 - 72)  BP: 130/83 (26 Jul 2021 04:45) (97/60 - 137/77)  BP(mean): --  ABP: --  ABP(mean): --  RR: 18 (26 Jul 2021 04:45) (16 - 18)  SpO2: 99% (26 Jul 2021 04:45) (97% - 100%)    Physical Exam:   General- NAD  ENT- R nasal cavity clear, no packing, no erythema  Intraoral skin paddle warm, well-perfused, incisions c/d/i with triphasic doppler signal  Neck incisions c/d/i  Cardiac- regular rate  Pulm- Breathing comfortably on RA  Ext- R thigh STSG donor site dressed with tegaderm, R fibula donor site wrapped with xeroform/telfa/kerlix/ace, incisions clean/dry/intact

## 2021-07-26 NOTE — DISCHARGE NOTE PROVIDER - NSDCFUADDINST_GEN_ALL_CORE_FT
Activity: No heavy lifting or strenuous activity until your follow-up appointment. Walk as tolerated.     Wound Care: You may shower. No baths, hot tubs or swimming until approved by your surgeon. Keep your R lower leg dry as much as possible.   For skin graft donor site: apply vaseline once a day, cover with telfa and tape.   For fibula donor site/skin graft: twice a day apply xeroform to skin graft with a telfa over the xeroform. Wrap the leg starting from the foot with a stretch Curity bandage followed by an ACE bandage. You may reuse the ACE bandage until it is soiled.     Follow up after discharge. Call office for appointment.    Call office for fever >101.5 or redness or drainage from wound.

## 2021-07-26 NOTE — DISCHARGE NOTE PROVIDER - NSDCFUADDAPPT_GEN_ALL_CORE_FT
Please follow up with a psychiatrist after discharge. It was recommended that your Sertraline dose is increased to 125 mg, and you were given Seroquel 25 mg q12h to help with your mood. Please follow up in order to determine if this medication should be continued.

## 2021-07-26 NOTE — DISCHARGE NOTE PROVIDER - CARE PROVIDERS DIRECT ADDRESSES
[email or web address]

## 2021-07-26 NOTE — DISCHARGE NOTE PROVIDER - HOSPITAL COURSE
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 Pt now s/p R maxillectomy, dental implants, R fibula MVFF, R thigh STSG 7/14.    07-15 Patient seen at bedside and discussed with attending. flap checks with strong doppler signal. nMAPs 70s-80s. no events overnight.   07-16 Patient seen at bedside and discussed with attending. no acute events overnight. flap checks with strong doppler. failed TOV, getting straight cath. tolerating diet.   07-17 Patient seen at bedside and discussed with attending. flap check strong doppler signal. MAPs at goal. continues to get straight cath 2/2 unable to void.   07-18 Patient seen at bedside and discussed with attending. flap check strong doppler signal. in poor spirits this AM. continues to get straight cathed. complaining of back pain this AM. BM x1 s/p enema.   07-19 Patient seen and evaluated at bedside. Downgraded to stepdown unit yesterday. Spirits improved this AM. Denies any significant pain. S/p medel placement by urology. Psych consult pending. No active complaints. BMx1.  07-20 Patient seen and evaluated at bedside. Patient complaining of persistent abdominal discomfort that feels like he has "gas" that began yesterday. Denies pain but is uncomfortable.  Neck IRVING with 5cc/24hr output and leg IRVING with 30cc/24hr. Seen by psych yesterday with recommendations to d/c ativan and zaleplon, start seroquel 25mg PRN, melatonin 3mg qhs. Urology recommended TOV at our discretion and that they are available if any issues. Yesterday, patient declined to walk with PT. No fevers, no difficulty breathing.  07-21 Patient seen and examined at bedside. NAEON. Flap check with strong doppler signal. Patient c/o pain yuki-incisional and some improved abdominal pain. Patient had multiple BMs yesterday afternoon/evening. Abd xray pending.   07-22 Patient seen and examined at bedside. TOV attempted overnight, but patient did not void, so received 2 bladder scans demonstrating urinary retention requiring straight cath. Patient having multiple bowel movements throughout previous day. Psychiatry was reconsulted yesterday stating seroquel 25mg q12h can be started. Leg IRVING removed yesterday. PT dispo recommendation of RONEL. SLP recommended full liquid diet, so patient was started on diet which he has tolerated thus far. States his abdominal discomfort is improved but not completely resolved.  07-23 Patient seen and examined at bedside. Transferred to regional floor overnight. Passed TOV in PM yesterday. Abd pain and distention with near complete resolution. Good PO intake so NGT and nasal trumpet were removed. Patient now ambulating with CAM boot and assistance. He is accepted to multiple SARs, and now awaiting patient decision for location.   07-24: Patient seen and examined at bedside. Voiding. Abd pain and distention with near complete resolution. Tolerating diet.. Patient now ambulating with CAM boot and assistance. He is accepted to multiple SARs, and now awaiting patient decision for location.   07-25: Patient seen and examined at bedside. Voiding. Abdominal pain resolved. Tolerating mechanical soft diet. Ambulating with CAM boot. Pending RONEL auth.   07-26 Patient seen at bedside and discussed with attending. ambulating, voiding and having BMs. tolerating soft diet. small area of dehiscence noted posteromedially. Pending RONEL.     Patient is currently ambulating appropriately, voiding freely, tolerating diet and pain is well controlled. On day of discharge, patient deemed stable and ready to return home.

## 2021-07-27 PROBLEM — C61 MALIGNANT NEOPLASM OF PROSTATE: Chronic | Status: ACTIVE | Noted: 2021-07-13

## 2021-07-27 PROBLEM — Z92.29 PERSONAL HISTORY OF OTHER DRUG THERAPY: Chronic | Status: ACTIVE | Noted: 2021-07-13

## 2021-07-27 PROBLEM — I10 ESSENTIAL (PRIMARY) HYPERTENSION: Chronic | Status: ACTIVE | Noted: 2021-07-13

## 2021-07-27 PROBLEM — Z87.19 PERSONAL HISTORY OF OTHER DISEASES OF THE DIGESTIVE SYSTEM: Chronic | Status: ACTIVE | Noted: 2021-07-13

## 2021-07-27 PROBLEM — D36.9 BENIGN NEOPLASM, UNSPECIFIED SITE: Chronic | Status: ACTIVE | Noted: 2021-07-13

## 2021-07-27 PROBLEM — E78.5 HYPERLIPIDEMIA, UNSPECIFIED: Chronic | Status: ACTIVE | Noted: 2021-07-13

## 2021-07-27 PROBLEM — A04.72 ENTEROCOLITIS DUE TO CLOSTRIDIUM DIFFICILE, NOT SPECIFIED AS RECURRENT: Chronic | Status: ACTIVE | Noted: 2021-07-14

## 2021-07-27 PROBLEM — F32.9 MAJOR DEPRESSIVE DISORDER, SINGLE EPISODE, UNSPECIFIED: Chronic | Status: ACTIVE | Noted: 2021-07-13

## 2021-07-29 LAB — SURGICAL PATHOLOGY STUDY: SIGNIFICANT CHANGE UP

## 2021-08-02 ENCOUNTER — APPOINTMENT (OUTPATIENT)
Dept: OTOLARYNGOLOGY | Facility: CLINIC | Age: 71
End: 2021-08-02
Payer: MEDICARE

## 2021-08-02 PROCEDURE — 99024 POSTOP FOLLOW-UP VISIT: CPT

## 2021-08-02 NOTE — HISTORY OF PRESENT ILLNESS
[de-identified] : Patient seen today in consultation at the kind request of Dr Martin Schmid for evaluation for reconstruction following right maxillectomy for ameloblastoma . Mr Brittany noted changes to his right upper gums in Jan 2021, thought he had a dental infection. Seen by a dentist, he had a scan which showed right maxillary mass. He then saw an ENT, Dr Damon Johnson, who performed biopsy, showed ameloblastoma. He was evaluated at [facility] and [facility] however they could not offer dental implants at the time of surgery, he then sought out Dr Schmid. Patient underwent MRI as well as CT LE last week in [place], [place]. He lives in [place].  [FreeTextEntry1] : 8/2/21: first post-op visit, doing well, s/p right infrastructure maxillectomy, right neck exploration (Binu), right OC FFF (Roche) 7/14/21 for ameloblastoma. Uneventful post-operative course. Currently at nearby rehab facility on [address]. Wound care to RLE twice a day by nursing at facility. Tolerating PO, minimal pain.

## 2021-08-02 NOTE — DATA REVIEWED
[de-identified] : relevant images and reports personally reviewed by me:\par 5/12/21\par CT sinus:\par 1. Status post interval sinonasal surgery.\par 2. Almost complete opacification of the right maxillary sinus with lytic expansion of the inferior maxilla. Differential consideration may include a polyp but the possibility of an inverted papilloma cannot be entirely excluded . If additional imaging is indicated clinically, a follow-up MRI may be performed for further evaluation.\par 3. Mild mucosal thickening in the bilateral frontal, left anterior ethmoid and the left maxillary sinuses.  [de-identified] : relevant images and reports personally reviewed by me:\par Rose Marie Accession Number : [id number]\par \par LORE SIDDIQI                     6\par \par \par \par Surgical Final Report\par \par \par \par \par Final Diagnosis\par \par 1. Nasal mucosal margin (FS):\par - Ameloblastoma involving respiratory mucosa.\par \par 2. Orbital floor margin (FS):\par - Bone and adipose tissue, negative for tumor.\par \par 3. Posterior superior margin (FS):\par - Ameloblastoma, in fibrovascular tissue.\par \par 4. Posterior margin (FS):\par - Ameloblastoma involving soft tissue and respiratory mucosa.\par \par 5. Nasal mucosal margin no. 2 (FS):\par - Respiratory mucosa with detached minute fragment of\par ameloblastoma, 1 mm maximum dimension (serial sections examined).\par \par 6. Right pterygoid plate margin (FS):\par - Fibroadipose tissue, striated muscle and bone, negative for\par tumor.\par \par 7. Right maxillectomy, partial:\par - Ameloblastoma, 5.0 cm mass, involving maxillary sinus and\par posterior maxilla, with bulky exophytic growth into maxillary\par sinus.\par - No malignant transformation or atypia seen.\par - Tumor focally merges with ulcerated squamous mucosa and\par destroys bone (7A,7H).\par - Resorption of tooth root noted (7U).\par - Margins, focally involved by tumor (posterior and superior-\par 7A,7K)\par \par 8. Posterior superior, margin no. 2 (FS):\par - Striated muscle and fibrovascular tissue, negative for tumor.\par \par 9. Nasal mucosa, margin no. 3 (FS):\par - Bone and respiratory mucosa, negative for tumor.\par - Foreign body/packing material also noted.\par \par 10. Posterior, margin no. 2 (FS):\par - Striated muscle and adipose tissue, negative for tumor,\par \par 11. Right soft palate margin (FS):\par - Striated muscle, respiratory mucosa and seromucinous gland,\par negative for tumor.\par \par 12. Tooth no. 6:\par \par \par \par \par \par LORE SIDDIQI S                     6\par \par \par \par Surgical Final Report\par \par \par \par \par - Tooth, gross exam.\par \par Verified by: Tonie Schultz M.D.\par (Electronic Signature)\par Reported on: 07/29/21 14:04 EDT, [facility], [address] [facility], [place] [address]\par Phone: [phone]   Fax: [phone]\par

## 2021-08-02 NOTE — PHYSICAL EXAM
[de-identified] : right neck incision c/d/i, well healed, sutures removed [Nasal Endoscopy Performed] : nasal endoscopy was performed, see procedure section for findings [de-identified] : right anterior nasal cavity debrided with foreceps, nasal passage clear, s/p maxillectomy [Midline] : trachea located in midline position [de-identified] : right hard palate with skin paddle well integrated. Area anterior to skin paddle with granulation tissue, no areas of dehiscence. Temporary right maxillary prosthesis secure and in place [de-identified] : right Patel Gil incision well healed, sutures removed. [Normal] : no rashes [de-identified] : see above [de-identified] : right LE incision well healed, STSG with 100% take, several areas of superficial epidermolysis, dressed with xeroform, telfa and kerlex, ace. Right thigh STSG donor site healing well, dressed with telfa and tape

## 2021-08-02 NOTE — ASSESSMENT
[FreeTextEntry1] : - healing well\par - saline nasal spray to nose Q2-3 hours\par - right LE wound care: thigh - change telfa daily; on lower part of LE, xeroform to distal area only, then telfa, kerlex and ace, daily changes\par - continue working with PT\par - encouraged ambulating without walker boot, walker boot for comfort only\par - f/up in 2 weeks

## 2021-08-17 ENCOUNTER — APPOINTMENT (OUTPATIENT)
Dept: OTOLARYNGOLOGY | Facility: CLINIC | Age: 71
End: 2021-08-17
Payer: MEDICARE

## 2021-08-17 DIAGNOSIS — J34.89 OTHER SPECIFIED DISORDERS OF NOSE AND NASAL SINUSES: ICD-10-CM

## 2021-08-17 DIAGNOSIS — D16.5 BENIGN NEOPLASM OF LOWER JAW BONE: ICD-10-CM

## 2021-08-17 PROCEDURE — 99024 POSTOP FOLLOW-UP VISIT: CPT

## 2021-08-17 RX ORDER — SERTRALINE HYDROCHLORIDE 50 MG/1
50 TABLET, FILM COATED ORAL
Qty: 90 | Refills: 0 | Status: ACTIVE | COMMUNITY
Start: 2021-06-05

## 2021-08-17 RX ORDER — ACETAMINOPHEN AND CODEINE 300; 30 MG/1; MG/1
300-30 TABLET ORAL
Qty: 30 | Refills: 0 | Status: ACTIVE | COMMUNITY
Start: 2021-04-01

## 2021-08-17 RX ORDER — PREDNISONE 5 MG/1
5 TABLET ORAL
Qty: 30 | Refills: 0 | Status: ACTIVE | COMMUNITY
Start: 2021-04-15

## 2021-08-17 RX ORDER — AZITHROMYCIN 250 MG/1
250 TABLET, FILM COATED ORAL
Qty: 6 | Refills: 0 | Status: ACTIVE | COMMUNITY
Start: 2021-04-01

## 2021-08-17 RX ORDER — CHLORHEXIDINE GLUCONATE, 0.12% ORAL RINSE 1.2 MG/ML
0.12 SOLUTION DENTAL
Qty: 473 | Refills: 0 | Status: ACTIVE | COMMUNITY
Start: 2021-08-10

## 2021-08-17 NOTE — PHYSICAL EXAM
[Nasal Endoscopy Performed] : nasal endoscopy was performed, see procedure section for findings [Midline] : trachea located in midline position [Normal] : no rashes [de-identified] : right neck incision c/d/i, well healed [de-identified] : right anterior nasal cavity debrided with foreceps, nasal passage clear, s/p maxillectomy [de-identified] : right hard palate with skin paddle well integrated. Area anterior to skin paddle with granulation tissue, no areas of dehiscence. Temporary right maxillary prosthesis secure and in place [de-identified] : right Patel Gil incision well healed [de-identified] : see above [de-identified] : right LE incision well healed, STSG with 100% take, 2cm area of granulation tissue, treated with silver nitrate, dressed with xeroform, kerlex, ace. Right thigh STSG donor site healing well, dressed with telfa and tape

## 2021-08-17 NOTE — DATA REVIEWED
[de-identified] : relevant images and reports personally reviewed by me:\par 5/12/21\par CT sinus:\par 1. Status post interval sinonasal surgery.\par 2. Almost complete opacification of the right maxillary sinus with lytic expansion of the inferior maxilla. Differential consideration may include a polyp but the possibility of an inverted papilloma cannot be entirely excluded . If additional imaging is indicated clinically, a follow-up MRI may be performed for further evaluation.\par 3. Mild mucosal thickening in the bilateral frontal, left anterior ethmoid and the left maxillary sinuses.  [de-identified] : relevant images and reports personally reviewed by me:\par Rose Marie Accession Number : [id number]\par \par LORE SIDDIQI                     6\par \par \par \par Surgical Final Report\par \par \par \par \par Final Diagnosis\par \par 1. Nasal mucosal margin (FS):\par - Ameloblastoma involving respiratory mucosa.\par \par 2. Orbital floor margin (FS):\par - Bone and adipose tissue, negative for tumor.\par \par 3. Posterior superior margin (FS):\par - Ameloblastoma, in fibrovascular tissue.\par \par 4. Posterior margin (FS):\par - Ameloblastoma involving soft tissue and respiratory mucosa.\par \par 5. Nasal mucosal margin no. 2 (FS):\par - Respiratory mucosa with detached minute fragment of\par ameloblastoma, 1 mm maximum dimension (serial sections examined).\par \par 6. Right pterygoid plate margin (FS):\par - Fibroadipose tissue, striated muscle and bone, negative for\par tumor.\par \par 7. Right maxillectomy, partial:\par - Ameloblastoma, 5.0 cm mass, involving maxillary sinus and\par posterior maxilla, with bulky exophytic growth into maxillary\par sinus.\par - No malignant transformation or atypia seen.\par - Tumor focally merges with ulcerated squamous mucosa and\par destroys bone (7A,7H).\par - Resorption of tooth root noted (7U).\par - Margins, focally involved by tumor (posterior and superior-\par 7A,7K)\par \par 8. Posterior superior, margin no. 2 (FS):\par - Striated muscle and fibrovascular tissue, negative for tumor.\par \par 9. Nasal mucosa, margin no. 3 (FS):\par - Bone and respiratory mucosa, negative for tumor.\par - Foreign body/packing material also noted.\par \par 10. Posterior, margin no. 2 (FS):\par - Striated muscle and adipose tissue, negative for tumor,\par \par 11. Right soft palate margin (FS):\par - Striated muscle, respiratory mucosa and seromucinous gland,\par negative for tumor.\par \par 12. Tooth no. 6:\par \par \par \par \par \par LORE SIDDIQI S                     6\par \par \par \par Surgical Final Report\par \par \par \par \par - Tooth, gross exam.\par \par Verified by: Tonie Schultz M.D.\par (Electronic Signature)\par Reported on: 07/29/21 14:04 EDT, [facility], [address] [facility] [facility], [place] [address]\par Phone: [phone]   Fax: [phone]\par

## 2021-08-17 NOTE — HISTORY OF PRESENT ILLNESS
[FreeTextEntry1] : \par 8/17/21 Patient here s/p right infrastructure maxillectomy, right neck exploration (Binu), right OC FFF (Roche) 7/14/21 for ameloblastoma. Has been at home for a week now. Patient admits doing well. Recently had a fall yesterday, got light headed, no LOC.  [de-identified] : Patient seen today in consultation at the kind request of Dr Martin Schmid for evaluation for reconstruction following right maxillectomy for ameloblastoma . Mr Brittany noted changes to his right upper gums in Jan 2021, thought he had a dental infection. Seen by a dentist, he had a scan which showed right maxillary mass. He then saw an ENT, Dr Damon Johnson, who performed biopsy, showed ameloblastoma. He was evaluated at [facility] and [facility] however they could not offer dental implants at the time of surgery, he then sought out Dr Schmid. Patient underwent MRI as well as CT LE last week in [place], [place]. He lives in [place]. \par \par 8/2/21: first post-op visit, doing well, s/p right infrastructure maxillectomy, right neck exploration (Bniu), right OC FFF (Roche) 7/14/21 for ameloblastoma. Uneventful post-operative course. Currently at nearby rehab facility on [address]. Wound care to RLE twice a day by nursing at facility. Tolerating PO, minimal pain.

## 2021-08-17 NOTE — ASSESSMENT
[FreeTextEntry1] : - healing well\par - saline nasal spray to nose Q2-3 hours\par - right LE wound care: thigh - change telfa daily; on lower part of LE, xeroform to distal area only, then 4x4s, kerlex and ace, daily changes\par - continue working with PT\par - continue ambulation without boot\par - f/up in 1 month

## 2021-08-17 NOTE — REASON FOR VISIT
[Post-Operative Visit] : a post-operative visit [FreeTextEntry2] : s/p right infrastructure maxillectomy, right neck exploration (Binu), right OC FFF (Roche) 7/14/21 for ameloblastoma

## 2021-10-01 ENCOUNTER — APPOINTMENT (OUTPATIENT)
Dept: OTOLARYNGOLOGY | Facility: CLINIC | Age: 71
End: 2021-10-01

## 2022-09-26 ENCOUNTER — NON-APPOINTMENT (OUTPATIENT)
Age: 72
End: 2022-09-26

## 2022-09-29 ENCOUNTER — NON-APPOINTMENT (OUTPATIENT)
Age: 72
End: 2022-09-29

## 2022-10-11 ENCOUNTER — APPOINTMENT (OUTPATIENT)
Dept: OTOLARYNGOLOGY | Facility: CLINIC | Age: 72
End: 2022-10-11

## 2022-10-11 VITALS
HEART RATE: 52 BPM | OXYGEN SATURATION: 95 % | SYSTOLIC BLOOD PRESSURE: 149 MMHG | DIASTOLIC BLOOD PRESSURE: 70 MMHG | WEIGHT: 190 LBS | HEIGHT: 69 IN | TEMPERATURE: 97.1 F | BODY MASS INDEX: 28.14 KG/M2

## 2022-10-11 DIAGNOSIS — M79.606 PAIN IN LEG, UNSPECIFIED: ICD-10-CM

## 2022-10-11 PROCEDURE — 31231 NASAL ENDOSCOPY DX: CPT

## 2022-10-11 PROCEDURE — 99214 OFFICE O/P EST MOD 30 MIN: CPT | Mod: 25

## 2022-10-11 RX ORDER — GABAPENTIN 100 MG/1
100 CAPSULE ORAL TWICE DAILY
Qty: 60 | Refills: 1 | Status: ACTIVE | COMMUNITY
Start: 2022-10-11 | End: 1900-01-01

## 2022-10-11 NOTE — PHYSICAL EXAM
[de-identified] : Post reconstructive surgery [de-identified] : Excellent mouth opening [de-identified] : Normal turbinates on the left surgically altered anatomy on the right as expected after maxillectomy [de-identified] : Some clear drainage noted on the right-hand side with bubbles in the middle meatus [de-identified] : Status post resection of upper right maxilla skin paddle is well-healed prosthetic in place no evidence of infection or exposed hardware [Normal] : cardiovascular peripheral examination is normal [de-identified] : Right lower extremity donor site is well-healed his skin graft and incision is well-healed patient does have some complaints related to the sensation of the lateral toes as well as his middle toes crossing also has some continued pain in the area.  No evidence of erythema or active infection.

## 2022-10-11 NOTE — PROCEDURE
[FreeTextEntry6] : After obtaining consent nasal endoscopy was performed the left-hand side appears relatively normal normal turbinate mucosa nasopharynx is normal with good eustachian tube no evidence of drainage polyps masses lesions or signs of the middle meatus is normal as is the nasal cavity floor of the nose is also normal mild septal deviation but otherwise normal.  Right side reveals markedly abnormal and anatomy as expected after maxillectomy some clear drainage with some trapped secretions underneath the inferior turbinate but no evidence of active infection.  Superiorly the skull base area and middle turbinate above the middle turbinate looks relatively normal.

## 2022-10-11 NOTE — HISTORY OF PRESENT ILLNESS
[de-identified] : This patient is a 72-year-old male referred to me by Dr. Schmid after undergoing a right infrastructure maxillectomy and right fibular free tissue transfer reconstruction by Dr. Dao for ameloblastoma.  Overall he has been doing pretty well he is seeing Dr. Velez for prosthetic management he has been having some chronic pain in his donor site.  Also complains of some issues with his sinuses where he gets runny nose every time he sits down to eat which is quite annoying to him.  Dr. Schmid has performed a CT sinus and MRI to look at this area but the films were not available for me to review today.

## 2022-11-09 DIAGNOSIS — J30.0 VASOMOTOR RHINITIS: ICD-10-CM

## 2022-11-09 RX ORDER — AZELASTINE HYDROCHLORIDE 137 UG/1
137 SPRAY, METERED NASAL TWICE DAILY
Qty: 1 | Refills: 3 | Status: ACTIVE | COMMUNITY
Start: 2022-11-09 | End: 1900-01-01

## 2023-07-14 NOTE — DIETITIAN INITIAL EVALUATION ADULT. - CALCULATED TO (G/KG)
76y Male with history of HTN, CHF, pancreatic CA on chemo presents with fevers and diarrhea. Nephrology consulted for hyponatremia.    1. Hyponatremia: Secondary to volume overload. Serum Na improving. F/U repeat BMP this morning. Urine studies consistent with decreased EABV. Continue with IV albumin 25% in 50 ml Q6 hours X 2 days but increase lasix to 80 mg IV twice daily given suboptimal UO. Continue with 1L FR. Will consider tolvaptan if LFT's continue to improve. Monitor serum Na.    2. HTN: BP low normal. Holding anti-hypertensive medications. Monitor BP.    3. LE edema: IV albumin and lasix as above. Continue with ensure to increase oncotic pressure. Monitor UO.    4. Chronic systolic HF: As per cardiology.       [facility] NEPHROLOGY  Matthias Keenan M.D.  Tobi Goodman D.O.  Portia Daily M.D.  Hanna Trammell, MSN, ANP-C    Telephone: [phone]  Facsimile: [phone]    [address] [place] [address]   Occupational Therapy    Visit Type: treatment and initial evaluation  SUBJECTIVE  Patient agreed to participate in therapy this date.  \"I always use my walker at home\"   \"my wife helps me with my socks sometimes when I cant do it\"   Patient / Family Goal: return to previous functional status, maximize function and return home    Pain   Patient reports pain is not an issue/concern.    At onset of session (out of 10): 0    OBJECTIVE     Cognitive Status   Level of Consciousness   - alert  Arousal Alertness   - appropriate responses to stimuli  Orientation    - Oriented to: person, place, time and situation  Functional Communication   - Overall Status: within functional limits   - Forms of Communication: verbal  Attention Span    - Attention: intact  Following Direction   - follows all commands and directions consistently  Transition Between Tasks   - transitions without difficulty    UE Function:    • Left: functional    • Right: functional      Range of Motion (ROM)   (degrees unless noted; active unless noted; norms in ( ); negative=lacking to 0, positive=beyond 0)  WFL: LUE, RUE    Strength  (out of 5 unless noted, standard test position unless noted)   WFL: LUE, RUE  Gross :  strength grossly equal bilateral      Sitting Balance  (JESSICA = base of support)  Static      - Trial 1 details: independent  Dynamic      - Trial 1 details: independent    Standing Balance  (JESSICA = base of support)  Firm Surface: Double Leg      - Static, Eyes Open       - Trial 1 details: modified independent and with double UE support       Bed Mobility  - Supine to sit: independent  - Sit to supine: independent  Transfers  Assistive devices: 2-wheeled walker  - Sit to stand: modified independent  - Stand to sit: modified independent      Functional Ambulation  - Assistance: modified independent  - Assistive device: 2-wheeled walker  Activities of Daily Living (ADLs)  Grooming/Oral Hygiene:        - Oral hygiene assist: modified  independent  - Position: standing at sink  Lower Body Dressing:   - Footwear:       - Assistance: modified independent (struggles for socks, requires extended time and effort )       - Position: edge of bed  Toileting:   - Toilet transfer:        - Assist: supervision, modified independent and with verbal cues (patient given verbal cues for hand placement and able to demo carryover of education for second attempt )  Patient educated on elevation of lower extremities and AROM of lower extremities to decrease swelling     Patient shown video of sock aid use with education PRN if swelling continues   Interventions    Training provided: use of adaptive equipment, ADL training, compensatory techniques, safety training and body mechanics*attempted to return to see patient a few hours after intial session to demo and allow patient to try sock aide but patient had discharged   Skilled input: verbal instruction/cues and tactile instruction/cues         Education:   - Present and ready to learn: patient  Education provided during session:  - Results of above outlined education: Verbalizes understanding and Demonstrates understanding    ASSESSMENT   Patient will benefit from inpatient skilled therapy to address current assessed functional limitations and impairments.    Summary of function and discharge needs based on today's assessment:  - Current level of function: at baseline level of function  - Therapy needs at discharge: does not require ongoing therapy  AM-PAC  - Prior Level of Function: IND/MOD I ([facility] 22-24)       Key: MOD A=moderate assistance, IND/MOD I=independent/modified independent  - Generalized Current Level of Function     - Current Self-Cares: 24       Scoring Key= >21 Modified Independent; 20-21 Supervision; 18-19 Minimal assist; 13-18 Moderate assist; 9-12 Max assist; <9 Total assist       • Personal Occupations Profile Affected: lower body dressing, functional mobility/transfers     • Clinical decision  making: Low - Patient has few limitations (1-3), comorbidities and/or complexities, as noted in problem focused assessment noted above, that impact their occupational profile.  Resulting in few treatment options and no task modification consistent with low clinical decision making complexity.    PLAN  Suggestions for next session as indicated:            PT/OT Mobility Equipment for Discharge: Pt would benefit from a RW.  PT/OT ADL Equipment for Discharge: sock aid  Interventions: use of adaptive equipment, positioning, safety training and ADL retraining  Agreement to plan and goals: patient agrees with goals and treatment plan      GOALS  Long Term Goals: (to be met by time of discharge from hospital)  Toilet transfer: Patient will complete toilet transfer with modified independent. Status: met     Patient will report understanding of sock aide use: met         Documented in the chart in the following areas:  Services. Prior Level of Function. Assessment/Plan.    Patient at End of Session:   Location: in bed  Safety measures: alarm system in place/re-engaged and call light within reach  Handoff to: nurse      Therapy procedure time and total treatment time can be found documented on the Time Entry flowsheet   108.75

## 2025-05-08 NOTE — PATIENT PROFILE ADULT - INTERNATIONAL TRAVEL
[place] [id number]                            DISCHARGE SUMMARY      PATIENT NAME: PHANI CARDOZO              : 1952  MED REC NO: [id number]                        ROOM: 0530  ACCOUNT NO: [id number]                       ADMIT DATE: 2025  PROVIDER: Rigo Carlton No